# Patient Record
Sex: MALE | Race: WHITE | Employment: OTHER | URBAN - NONMETROPOLITAN AREA
[De-identification: names, ages, dates, MRNs, and addresses within clinical notes are randomized per-mention and may not be internally consistent; named-entity substitution may affect disease eponyms.]

---

## 2019-11-15 ENCOUNTER — OFFICE VISIT (OUTPATIENT)
Dept: CARDIOLOGY CLINIC | Age: 54
End: 2019-11-15
Payer: MEDICARE

## 2019-11-15 VITALS
DIASTOLIC BLOOD PRESSURE: 80 MMHG | WEIGHT: 242 LBS | HEIGHT: 65 IN | BODY MASS INDEX: 40.32 KG/M2 | HEART RATE: 100 BPM | SYSTOLIC BLOOD PRESSURE: 134 MMHG

## 2019-11-15 DIAGNOSIS — E78.01 FAMILIAL HYPERCHOLESTEROLEMIA: ICD-10-CM

## 2019-11-15 DIAGNOSIS — I10 ESSENTIAL HYPERTENSION: ICD-10-CM

## 2019-11-15 DIAGNOSIS — R06.83 SNORING: ICD-10-CM

## 2019-11-15 DIAGNOSIS — I25.10 CORONARY ARTERY DISEASE INVOLVING NATIVE CORONARY ARTERY OF NATIVE HEART WITHOUT ANGINA PECTORIS: ICD-10-CM

## 2019-11-15 DIAGNOSIS — R06.81 WITNESSED APNEIC SPELLS: Primary | ICD-10-CM

## 2019-11-15 PROCEDURE — G8417 CALC BMI ABV UP PARAM F/U: HCPCS | Performed by: NUCLEAR MEDICINE

## 2019-11-15 PROCEDURE — 4004F PT TOBACCO SCREEN RCVD TLK: CPT | Performed by: NUCLEAR MEDICINE

## 2019-11-15 PROCEDURE — 99204 OFFICE O/P NEW MOD 45 MIN: CPT | Performed by: NUCLEAR MEDICINE

## 2019-11-15 PROCEDURE — G8428 CUR MEDS NOT DOCUMENT: HCPCS | Performed by: NUCLEAR MEDICINE

## 2019-11-15 PROCEDURE — 93000 ELECTROCARDIOGRAM COMPLETE: CPT | Performed by: NUCLEAR MEDICINE

## 2019-11-15 PROCEDURE — G8598 ASA/ANTIPLAT THER USED: HCPCS | Performed by: NUCLEAR MEDICINE

## 2019-11-15 PROCEDURE — G8484 FLU IMMUNIZE NO ADMIN: HCPCS | Performed by: NUCLEAR MEDICINE

## 2019-11-15 PROCEDURE — 3017F COLORECTAL CA SCREEN DOC REV: CPT | Performed by: NUCLEAR MEDICINE

## 2019-11-15 RX ORDER — ATORVASTATIN CALCIUM 40 MG/1
40 TABLET, FILM COATED ORAL DAILY
COMMUNITY

## 2019-11-15 RX ORDER — BUSPIRONE HYDROCHLORIDE 15 MG/1
15 TABLET ORAL 3 TIMES DAILY
COMMUNITY

## 2019-11-15 RX ORDER — DOXEPIN HYDROCHLORIDE 100 MG/1
100 CAPSULE ORAL NIGHTLY
COMMUNITY

## 2019-11-15 RX ORDER — BUPROPION HYDROCHLORIDE 150 MG/1
150 TABLET, EXTENDED RELEASE ORAL DAILY
Status: ON HOLD | COMMUNITY
End: 2020-10-05 | Stop reason: HOSPADM

## 2019-11-15 RX ORDER — LISINOPRIL 10 MG/1
10 TABLET ORAL DAILY
COMMUNITY

## 2019-11-15 ASSESSMENT — ENCOUNTER SYMPTOMS
ANAL BLEEDING: 0
DIARRHEA: 0
CONSTIPATION: 0
SHORTNESS OF BREATH: 1
PHOTOPHOBIA: 0
RECTAL PAIN: 0
CHEST TIGHTNESS: 1
NAUSEA: 0
ABDOMINAL PAIN: 0
FACIAL SWELLING: 0
RHINORRHEA: 0
BACK PAIN: 1
ABDOMINAL DISTENTION: 0
BLOOD IN STOOL: 0
VOMITING: 0
COLOR CHANGE: 0

## 2019-11-26 DIAGNOSIS — I10 ESSENTIAL HYPERTENSION: ICD-10-CM

## 2019-11-26 DIAGNOSIS — R06.81 WITNESSED APNEIC SPELLS: ICD-10-CM

## 2019-11-26 DIAGNOSIS — R06.83 SNORING: ICD-10-CM

## 2019-11-26 DIAGNOSIS — I25.10 CORONARY ARTERY DISEASE INVOLVING NATIVE CORONARY ARTERY OF NATIVE HEART WITHOUT ANGINA PECTORIS: ICD-10-CM

## 2019-11-26 DIAGNOSIS — E78.01 FAMILIAL HYPERCHOLESTEROLEMIA: ICD-10-CM

## 2020-05-22 ENCOUNTER — VIRTUAL VISIT (OUTPATIENT)
Dept: CARDIOLOGY CLINIC | Age: 55
End: 2020-05-22
Payer: MEDICARE

## 2020-05-22 ENCOUNTER — TELEPHONE (OUTPATIENT)
Dept: CARDIOLOGY CLINIC | Age: 55
End: 2020-05-22

## 2020-05-22 PROCEDURE — 3017F COLORECTAL CA SCREEN DOC REV: CPT | Performed by: NUCLEAR MEDICINE

## 2020-05-22 PROCEDURE — 99213 OFFICE O/P EST LOW 20 MIN: CPT | Performed by: NUCLEAR MEDICINE

## 2020-05-22 PROCEDURE — G8428 CUR MEDS NOT DOCUMENT: HCPCS | Performed by: NUCLEAR MEDICINE

## 2020-05-22 NOTE — PROGRESS NOTES
times daily      atorvastatin (LIPITOR) 40 MG tablet Take 40 mg by mouth daily      fluticasone (FLOVENT HFA) 110 MCG/ACT inhaler Inhale 1 puff into the lungs 2 times daily      furosemide (LASIX) 20 MG tablet Take 20 mg by mouth daily       lurasidone (LATUDA) 20 MG TABS tablet Take 20 mg by mouth 2 times daily      escitalopram (LEXAPRO) 20 MG tablet Take 20 mg by mouth daily      gabapentin (NEURONTIN) 300 MG capsule Take 600 mg by mouth 3 times daily.  divalproex (DEPAKOTE) 250 MG ER tablet Take 500 mg by mouth 2 times daily Take 1 tab in the morning, 2 tabs at night      metoprolol (LOPRESSOR) 25 MG tablet Take 50 mg by mouth daily       tizanidine (ZANAFLEX) 4 MG tablet Take 4 mg by mouth every 6 hours as needed.  aspirin 325 MG tablet Take 325 mg by mouth daily.  nitroGLYCERIN (NITROSTAT) 0.4 MG SL tablet Place 0.4 mg under the tongue every 5 minutes as needed.  clopidogrel (PLAVIX) 75 MG tablet Take 75 mg by mouth daily.  albuterol (PROAIR HFA) 108 (90 BASE) MCG/ACT inhaler Inhale 2 puffs into the lungs every 6 hours as needed. No current facility-administered medications for this visit.       Allergies   Allergen Reactions    Zantac [Ranitidine Hcl] Anaphylaxis     Health Maintenance   Topic Date Due    Pneumococcal 0-64 years Vaccine (1 of 1 - PPSV23) 04/03/1972    HIV screen  04/03/1981    DTaP/Tdap/Td vaccine (1 - Tdap) 04/03/1985    Diabetes screen  04/03/2006    Lipid screen  12/02/2012    Shingles Vaccine (1 of 2) 04/03/2016    Colon cancer screen colonoscopy  04/03/2016    Potassium monitoring  09/14/2016    Creatinine monitoring  09/14/2016    Annual Wellness Visit (AWV)  10/13/2019    Flu vaccine (Season Ended) 09/01/2020    Hepatitis A vaccine  Aged Out    Hepatitis B vaccine  Aged Out    Hib vaccine  Aged Out    Meningococcal (ACWY) vaccine  Aged Out       Subjective:  Review of Systems  General:   No fever, no chills, some fatigue questions answered. Pt voicedunderstanding. Instructed to continue current medications, diet and exercise. Continue risk factor modification and medical management. Patient agreed with treatment plan. Follow up as directed.     Electronically signedby Ashley Roque MD on 5/22/2020 at 1:38 PM

## 2020-10-02 ENCOUNTER — HOSPITAL ENCOUNTER (INPATIENT)
Age: 55
LOS: 3 days | Discharge: HOME | DRG: 445 | End: 2020-10-05
Attending: FAMILY MEDICINE | Admitting: FAMILY MEDICINE
Payer: MEDICARE

## 2020-10-02 PROBLEM — R07.9 CHEST PAIN: Status: ACTIVE | Noted: 2020-10-02

## 2020-10-02 LAB
ALBUMIN SERPL-MCNC: 3.8 G/DL (ref 3.5–5.1)
ALP BLD-CCNC: 96 U/L (ref 38–126)
ALT SERPL-CCNC: 34 U/L (ref 11–66)
ANION GAP SERPL CALCULATED.3IONS-SCNC: 12 MEQ/L (ref 8–16)
APTT: 24.6 SECONDS (ref 22–38)
AST SERPL-CCNC: 38 U/L (ref 5–40)
BASOPHILS # BLD: 0.4 %
BASOPHILS ABSOLUTE: 0.1 THOU/MM3 (ref 0–0.1)
BILIRUB SERPL-MCNC: 0.3 MG/DL (ref 0.3–1.2)
BUN BLDV-MCNC: 10 MG/DL (ref 7–22)
CALCIUM SERPL-MCNC: 9.1 MG/DL (ref 8.5–10.5)
CHLORIDE BLD-SCNC: 103 MEQ/L (ref 98–111)
CO2: 24 MEQ/L (ref 23–33)
CREAT SERPL-MCNC: 0.8 MG/DL (ref 0.4–1.2)
EKG ATRIAL RATE: 91 BPM
EKG P AXIS: 44 DEGREES
EKG P-R INTERVAL: 138 MS
EKG Q-T INTERVAL: 354 MS
EKG QRS DURATION: 82 MS
EKG QTC CALCULATION (BAZETT): 435 MS
EKG R AXIS: 17 DEGREES
EKG T AXIS: 14 DEGREES
EKG VENTRICULAR RATE: 91 BPM
EOSINOPHIL # BLD: 0.1 %
EOSINOPHILS ABSOLUTE: 0 THOU/MM3 (ref 0–0.4)
ERYTHROCYTE [DISTWIDTH] IN BLOOD BY AUTOMATED COUNT: 16.1 % (ref 11.5–14.5)
ERYTHROCYTE [DISTWIDTH] IN BLOOD BY AUTOMATED COUNT: 46.9 FL (ref 35–45)
GFR SERPL CREATININE-BSD FRML MDRD: > 90 ML/MIN/1.73M2
GLUCOSE BLD-MCNC: 129 MG/DL (ref 70–108)
HCT VFR BLD CALC: 36.3 % (ref 42–52)
HEMOGLOBIN: 11.6 GM/DL (ref 14–18)
IMMATURE GRANS (ABS): 0.06 THOU/MM3 (ref 0–0.07)
IMMATURE GRANULOCYTES: 0.4 %
INR BLD: 1.08 (ref 0.85–1.13)
LYMPHOCYTES # BLD: 19.5 %
LYMPHOCYTES ABSOLUTE: 3 THOU/MM3 (ref 1–4.8)
MAGNESIUM: 1.9 MG/DL (ref 1.6–2.4)
MCH RBC QN AUTO: 25.8 PG (ref 26–33)
MCHC RBC AUTO-ENTMCNC: 32 GM/DL (ref 32.2–35.5)
MCV RBC AUTO: 80.7 FL (ref 80–94)
MONOCYTES # BLD: 5.5 %
MONOCYTES ABSOLUTE: 0.8 THOU/MM3 (ref 0.4–1.3)
NUCLEATED RED BLOOD CELLS: 0 /100 WBC
PLATELET # BLD: 369 THOU/MM3 (ref 130–400)
PMV BLD AUTO: 8.5 FL (ref 9.4–12.4)
POTASSIUM SERPL-SCNC: 4.3 MEQ/L (ref 3.5–5.2)
PRO-BNP: 139.1 PG/ML (ref 0–900)
RBC # BLD: 4.5 MILL/MM3 (ref 4.7–6.1)
SEG NEUTROPHILS: 74.1 %
SEGMENTED NEUTROPHILS ABSOLUTE COUNT: 11.4 THOU/MM3 (ref 1.8–7.7)
SODIUM BLD-SCNC: 139 MEQ/L (ref 135–145)
TOTAL PROTEIN: 7.3 G/DL (ref 6.1–8)
TROPONIN T: < 0.01 NG/ML
TROPONIN T: < 0.01 NG/ML
WBC # BLD: 15.4 THOU/MM3 (ref 4.8–10.8)

## 2020-10-02 PROCEDURE — 2580000003 HC RX 258: Performed by: PHYSICIAN ASSISTANT

## 2020-10-02 PROCEDURE — 36415 COLL VENOUS BLD VENIPUNCTURE: CPT

## 2020-10-02 PROCEDURE — 6360000002 HC RX W HCPCS

## 2020-10-02 PROCEDURE — 85610 PROTHROMBIN TIME: CPT

## 2020-10-02 PROCEDURE — 85025 COMPLETE CBC W/AUTO DIFF WBC: CPT

## 2020-10-02 PROCEDURE — 94640 AIRWAY INHALATION TREATMENT: CPT

## 2020-10-02 PROCEDURE — G0378 HOSPITAL OBSERVATION PER HR: HCPCS

## 2020-10-02 PROCEDURE — 85730 THROMBOPLASTIN TIME PARTIAL: CPT

## 2020-10-02 PROCEDURE — 99222 1ST HOSP IP/OBS MODERATE 55: CPT | Performed by: PHYSICIAN ASSISTANT

## 2020-10-02 PROCEDURE — 84484 ASSAY OF TROPONIN QUANT: CPT

## 2020-10-02 PROCEDURE — 83880 ASSAY OF NATRIURETIC PEPTIDE: CPT

## 2020-10-02 PROCEDURE — G0379 DIRECT REFER HOSPITAL OBSERV: HCPCS

## 2020-10-02 PROCEDURE — 80053 COMPREHEN METABOLIC PANEL: CPT

## 2020-10-02 PROCEDURE — 1200000003 HC TELEMETRY R&B

## 2020-10-02 PROCEDURE — 94760 N-INVAS EAR/PLS OXIMETRY 1: CPT

## 2020-10-02 PROCEDURE — 93005 ELECTROCARDIOGRAM TRACING: CPT | Performed by: FAMILY MEDICINE

## 2020-10-02 PROCEDURE — 83735 ASSAY OF MAGNESIUM: CPT

## 2020-10-02 PROCEDURE — 6370000000 HC RX 637 (ALT 250 FOR IP): Performed by: PHYSICIAN ASSISTANT

## 2020-10-02 RX ORDER — DOXEPIN HYDROCHLORIDE 50 MG/1
100 CAPSULE ORAL NIGHTLY
Status: DISCONTINUED | OUTPATIENT
Start: 2020-10-02 | End: 2020-10-05 | Stop reason: HOSPADM

## 2020-10-02 RX ORDER — OMEPRAZOLE 20 MG/1
20 CAPSULE, DELAYED RELEASE ORAL DAILY
COMMUNITY

## 2020-10-02 RX ORDER — MORPHINE SULFATE 2 MG/ML
INJECTION, SOLUTION INTRAMUSCULAR; INTRAVENOUS
Status: COMPLETED
Start: 2020-10-02 | End: 2020-10-02

## 2020-10-02 RX ORDER — ASPIRIN 81 MG/1
81 TABLET, CHEWABLE ORAL DAILY
Status: DISCONTINUED | OUTPATIENT
Start: 2020-10-03 | End: 2020-10-05 | Stop reason: HOSPADM

## 2020-10-02 RX ORDER — POTASSIUM CHLORIDE 750 MG/1
10 TABLET, EXTENDED RELEASE ORAL DAILY
COMMUNITY

## 2020-10-02 RX ORDER — FLUTICASONE PROPIONATE 110 UG/1
1 AEROSOL, METERED RESPIRATORY (INHALATION) 2 TIMES DAILY
Status: DISCONTINUED | OUTPATIENT
Start: 2020-10-02 | End: 2020-10-05 | Stop reason: HOSPADM

## 2020-10-02 RX ORDER — MORPHINE SULFATE 2 MG/ML
2 INJECTION, SOLUTION INTRAMUSCULAR; INTRAVENOUS ONCE
Status: DISCONTINUED | OUTPATIENT
Start: 2020-10-02 | End: 2020-10-03

## 2020-10-02 RX ORDER — PROMETHAZINE HYDROCHLORIDE 25 MG/1
12.5 TABLET ORAL EVERY 6 HOURS PRN
Status: DISCONTINUED | OUTPATIENT
Start: 2020-10-02 | End: 2020-10-05 | Stop reason: HOSPADM

## 2020-10-02 RX ORDER — ESCITALOPRAM OXALATE 20 MG/1
20 TABLET ORAL DAILY
Status: DISCONTINUED | OUTPATIENT
Start: 2020-10-03 | End: 2020-10-05 | Stop reason: HOSPADM

## 2020-10-02 RX ORDER — POLYETHYLENE GLYCOL 3350 17 G/17G
17 POWDER, FOR SOLUTION ORAL DAILY PRN
Status: DISCONTINUED | OUTPATIENT
Start: 2020-10-02 | End: 2020-10-05 | Stop reason: HOSPADM

## 2020-10-02 RX ORDER — GABAPENTIN 400 MG/1
800 CAPSULE ORAL 3 TIMES DAILY
Status: DISCONTINUED | OUTPATIENT
Start: 2020-10-02 | End: 2020-10-05 | Stop reason: HOSPADM

## 2020-10-02 RX ORDER — LISINOPRIL 10 MG/1
10 TABLET ORAL DAILY
Status: DISCONTINUED | OUTPATIENT
Start: 2020-10-03 | End: 2020-10-05 | Stop reason: HOSPADM

## 2020-10-02 RX ORDER — BUSPIRONE HYDROCHLORIDE 7.5 MG/1
15 TABLET ORAL 3 TIMES DAILY
Status: DISCONTINUED | OUTPATIENT
Start: 2020-10-02 | End: 2020-10-05 | Stop reason: HOSPADM

## 2020-10-02 RX ORDER — ATORVASTATIN CALCIUM 40 MG/1
40 TABLET, FILM COATED ORAL NIGHTLY
Status: DISCONTINUED | OUTPATIENT
Start: 2020-10-02 | End: 2020-10-02

## 2020-10-02 RX ORDER — SODIUM CHLORIDE 0.9 % (FLUSH) 0.9 %
10 SYRINGE (ML) INJECTION PRN
Status: DISCONTINUED | OUTPATIENT
Start: 2020-10-02 | End: 2020-10-05 | Stop reason: HOSPADM

## 2020-10-02 RX ORDER — ACETAMINOPHEN 650 MG/1
650 SUPPOSITORY RECTAL EVERY 6 HOURS PRN
Status: DISCONTINUED | OUTPATIENT
Start: 2020-10-02 | End: 2020-10-04

## 2020-10-02 RX ORDER — SODIUM CHLORIDE 0.9 % (FLUSH) 0.9 %
10 SYRINGE (ML) INJECTION EVERY 12 HOURS SCHEDULED
Status: DISCONTINUED | OUTPATIENT
Start: 2020-10-02 | End: 2020-10-05 | Stop reason: HOSPADM

## 2020-10-02 RX ORDER — ACETAMINOPHEN 325 MG/1
650 TABLET ORAL EVERY 6 HOURS PRN
Status: DISCONTINUED | OUTPATIENT
Start: 2020-10-02 | End: 2020-10-04

## 2020-10-02 RX ORDER — BUPROPION HYDROCHLORIDE 150 MG/1
150 TABLET, EXTENDED RELEASE ORAL DAILY
Status: DISCONTINUED | OUTPATIENT
Start: 2020-10-02 | End: 2020-10-02

## 2020-10-02 RX ORDER — PANTOPRAZOLE SODIUM 40 MG/1
40 TABLET, DELAYED RELEASE ORAL
Status: DISCONTINUED | OUTPATIENT
Start: 2020-10-03 | End: 2020-10-05 | Stop reason: HOSPADM

## 2020-10-02 RX ORDER — NITROGLYCERIN 0.4 MG/1
0.4 TABLET SUBLINGUAL EVERY 5 MIN PRN
Status: COMPLETED | OUTPATIENT
Start: 2020-10-02 | End: 2020-10-02

## 2020-10-02 RX ORDER — CLOPIDOGREL BISULFATE 75 MG/1
75 TABLET ORAL DAILY
Status: DISCONTINUED | OUTPATIENT
Start: 2020-10-03 | End: 2020-10-05 | Stop reason: HOSPADM

## 2020-10-02 RX ORDER — ONDANSETRON 2 MG/ML
4 INJECTION INTRAMUSCULAR; INTRAVENOUS EVERY 6 HOURS PRN
Status: DISCONTINUED | OUTPATIENT
Start: 2020-10-02 | End: 2020-10-05 | Stop reason: HOSPADM

## 2020-10-02 RX ORDER — FUROSEMIDE 20 MG/1
20 TABLET ORAL DAILY
Status: DISCONTINUED | OUTPATIENT
Start: 2020-10-03 | End: 2020-10-05 | Stop reason: HOSPADM

## 2020-10-02 RX ORDER — SUCRALFATE 1 G/1
1 TABLET ORAL EVERY 6 HOURS SCHEDULED
Status: DISCONTINUED | OUTPATIENT
Start: 2020-10-03 | End: 2020-10-05 | Stop reason: HOSPADM

## 2020-10-02 RX ORDER — ASPIRIN 325 MG
325 TABLET ORAL DAILY
Status: DISCONTINUED | OUTPATIENT
Start: 2020-10-02 | End: 2020-10-02

## 2020-10-02 RX ORDER — ATORVASTATIN CALCIUM 40 MG/1
40 TABLET, FILM COATED ORAL DAILY
Status: DISCONTINUED | OUTPATIENT
Start: 2020-10-03 | End: 2020-10-05 | Stop reason: HOSPADM

## 2020-10-02 RX ORDER — DIVALPROEX SODIUM 500 MG/1
500 TABLET, EXTENDED RELEASE ORAL 2 TIMES DAILY
Status: DISCONTINUED | OUTPATIENT
Start: 2020-10-02 | End: 2020-10-02

## 2020-10-02 RX ADMIN — BUSPIRONE HYDROCHLORIDE 15 MG: 7.5 TABLET ORAL at 20:56

## 2020-10-02 RX ADMIN — LIDOCAINE HYDROCHLORIDE: 20 SOLUTION ORAL; TOPICAL at 19:59

## 2020-10-02 RX ADMIN — MORPHINE SULFATE 2 MG: 2 INJECTION, SOLUTION INTRAMUSCULAR; INTRAVENOUS at 20:49

## 2020-10-02 RX ADMIN — GABAPENTIN 800 MG: 400 CAPSULE ORAL at 20:56

## 2020-10-02 RX ADMIN — LIDOCAINE HYDROCHLORIDE: 20 SOLUTION ORAL; TOPICAL at 23:57

## 2020-10-02 RX ADMIN — LURASIDONE HYDROCHLORIDE 80 MG: 40 TABLET, FILM COATED ORAL at 20:56

## 2020-10-02 RX ADMIN — DOXEPIN HYDROCHLORIDE 100 MG: 50 CAPSULE ORAL at 20:55

## 2020-10-02 RX ADMIN — FLUTICASONE PROPIONATE 1 PUFF: 110 AEROSOL, METERED RESPIRATORY (INHALATION) at 21:06

## 2020-10-02 RX ADMIN — NITROGLYCERIN 0.4 MG: 0.4 TABLET, ORALLY DISINTEGRATING SUBLINGUAL at 20:30

## 2020-10-02 RX ADMIN — Medication 10 ML: at 20:55

## 2020-10-02 RX ADMIN — METOPROLOL TARTRATE 12.5 MG: 25 TABLET ORAL at 20:55

## 2020-10-02 RX ADMIN — NITROGLYCERIN 0.4 MG: 0.4 TABLET, ORALLY DISINTEGRATING SUBLINGUAL at 20:23

## 2020-10-02 RX ADMIN — NITROGLYCERIN 0.4 MG: 0.4 TABLET, ORALLY DISINTEGRATING SUBLINGUAL at 20:37

## 2020-10-02 ASSESSMENT — PAIN SCALES - GENERAL
PAINLEVEL_OUTOF10: 8
PAINLEVEL_OUTOF10: 10
PAINLEVEL_OUTOF10: 8
PAINLEVEL_OUTOF10: 10
PAINLEVEL_OUTOF10: 10

## 2020-10-02 ASSESSMENT — PAIN DESCRIPTION - PROGRESSION
CLINICAL_PROGRESSION: NOT CHANGED

## 2020-10-02 ASSESSMENT — PAIN DESCRIPTION - ORIENTATION: ORIENTATION: MID

## 2020-10-02 ASSESSMENT — PAIN DESCRIPTION - FREQUENCY: FREQUENCY: CONTINUOUS

## 2020-10-02 ASSESSMENT — PAIN DESCRIPTION - ONSET: ONSET: ON-GOING

## 2020-10-02 ASSESSMENT — PAIN DESCRIPTION - DESCRIPTORS: DESCRIPTORS: ACHING

## 2020-10-02 ASSESSMENT — PAIN - FUNCTIONAL ASSESSMENT: PAIN_FUNCTIONAL_ASSESSMENT: PREVENTS OR INTERFERES SOME ACTIVE ACTIVITIES AND ADLS

## 2020-10-02 ASSESSMENT — PAIN DESCRIPTION - PAIN TYPE: TYPE: ACUTE PAIN

## 2020-10-02 ASSESSMENT — PAIN DESCRIPTION - DIRECTION: RADIATING_TOWARDS: NO

## 2020-10-02 ASSESSMENT — PAIN DESCRIPTION - LOCATION: LOCATION: CHEST

## 2020-10-02 NOTE — PROGRESS NOTES
Transfer  Report from [place] [id number]  Referring Physician  Dr. Cara Ascencio  Accepting Physician  Dr. Paulie Rosario  Patient Reji Serrano  [birth year] patient of Dr. Cara Ascencio at [facility] ER that see Dr. Elaine Lowry has a history of stents, CAD, HTN, hyperlipidemia. Went to the ER today with complaints of chest pain, sweating, dry heaving, no hypoxia. Given Heparin, Nitro, Zofran, Morphine. Dr. Cara Ascencio states no changes on the monitor. Troponin -, WBC 13.9, Chemistry-good. CXR -, No Covid testing done. Vitals 147/75, 85, 20, 98% RA.  Admit-Obs, Tele, Chest pain

## 2020-10-02 NOTE — H&P
[facility] Hospitalist History & Physical   10/2/2020  6:21 PM   Assessment and Plan:        1. Acute Epigastic Abdominal Pain / Hx GERD  a. Worse with food. Burning. Describes as heart burn. b. TTP of epigastric region. No guarding, rigidity. c. Trial GI cocktail. Resume PPI. Consult GI.  2. CAD s/p 1 stent (2008) / HFpEF 60%, compensated:  a. Denies CP. Epigastric pain did not improve with nitro and nonexertional. Follows with Dr. Ramirez Vinson  b. Troponin neg x 1 (at [facility]) EKG (from [facility]) shows NSR with inverted T-waves/q-wave in aVR  c. Trend troponin x2 to r/o cardiac etiology of epigastric pain. Resume DAPT/statin/lasix. 3. Chronic Normocytic Anemia, stable: unclear etiology, ? Chronic disease.  hgb 13.9, MCV 78. Monitor with CBC daily. 4. COPD (without baseline supplement O2 use) / Asthma, without acute exacerbation:  a. Will wean patient off of O2 with no documented hypoxia. Resume home inhaled steroid. Unclear why not on tiotropium inhaler, consider starting. 5. NIDDM type 2, controlled: will hold metformin. SSI for goal -180 daily. Hypoglycemia orders. 6. 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 Resume DAPT  7. 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 Depression / Anxiety / Fibromyalgia: resume home doxepin, gabapentin, latuda, buspar, lexapro. 9. HLD: resume home statin  10. Essential Hypertension, controlled: Resume home ACEi, lasix, BB  11. Obesity: BMI 39,  on weight loss.    12. Tobacco Use Disorder:  on tobacco cessation      CC: Epigastric Abdominal pain  HPI: Tai Dempsey is a 48year old white male smoker with PMH of CAD s/p 1stent (2008), CVA (03/2019) with residual left facial droop and left sided weakness, GERD, NIDDM type 2, Fibromyalgia, DVT (20 years ago) not on anticoagulation, HFpEF, COPD without baseline supplmental O2 use who presented to [facility] as direct transfer from [facility] complaining of 10/10 \"really bad heart burn\" that feels like burning, aching and occasionally sharp that started around 15:00 on today after eating and then lying down. It improved while in the ED after receiving morphine but worsened again after he at in the ED. It is not exertional, and did not improve with the SL nitro/nitro drip. Associated nausea and vomiting. Denies arm/shoulder/jaw pain, denies inc SOB from baseline, lightheadedness, hematochezia, hematemesis, melena, fever. In the ED, vitals showed: HR 94, /77, RR 23, 93% on RA on arrival but was placed on 3L for unclear reason and afebrile. Labs showsed: WBC 13.9, hgb 12.4, MCV 78, plt 414, CMP unremarkable, ddimer mildly elevated at 0.52 (0-0.49 as reference range). Troponin neg. CXR showed no acute findings. Patient was given SL nitro/nitro drip (which did not relieve the pain), zofran, heparin drip, and morphine. ROS: A 14 point ROS was performed and was negative other than the pertinent positives noted in the HPI  PMH:  Per HPI  SHX:  Denies EtOH use, denies drug use. Current smoker: 5 cigarettes a day. Caffeine: 4 sodas/day. FHX: Mother/Father: CAD/MI, CVA, DM, HTN, HLD, A.fib.   Allergies: Bees, Zyrtec  Medications:       sodium chloride flush  10 mL Intravenous 2 times per day    [START ON 10/3/2020] aspirin  81 mg Oral Daily    [START ON 10/3/2020] atorvastatin  40 mg Oral Daily    busPIRone  15 mg Oral TID    [START ON 10/3/2020] clopidogrel  75 mg Oral Daily    doxepin  100 mg Oral Nightly    [START ON 10/3/2020] escitalopram  20 mg Oral Daily    fluticasone  1 puff Inhalation BID    [START ON 10/3/2020] furosemide  20 mg Oral Daily    gabapentin  800 mg Oral TID    [START ON 10/3/2020] lisinopril  10 mg Oral Daily    lurasidone  80 mg Oral Dinner    metoprolol tartrate  12.5 mg Oral BID    [START ON 10/3/2020] pantoprazole  40 mg Oral QAM AC    [Held by provider] buPROPion  150 mg Oral Daily    [Held by provider] meq/L    Potassium 4.3 3.5 - 5.2 meq/L    Chloride 103 98 - 111 meq/L    CO2 24 23 - 33 meq/L    Calcium 9.1 8.5 - 10.5 mg/dL    AST 38 5 - 40 U/L    Alkaline Phosphatase 96 38 - 126 U/L    Total Protein 7.3 6.1 - 8.0 g/dL    Alb 3.8 3.5 - 5.1 g/dL    Total Bilirubin 0.3 0.3 - 1.2 mg/dL    ALT 34 11 - 66 U/L   Magnesium   Result Value Ref Range    Magnesium 1.9 1.6 - 2.4 mg/dL   CBC auto differential   Result Value Ref Range    WBC 15.4 (H) 4.8 - 10.8 thou/mm3    RBC 4.50 (L) 4.70 - 6.10 mill/mm3    Hemoglobin 11.6 (L) 14.0 - 18.0 gm/dl    Hematocrit 36.3 (L) 42.0 - 52.0 %    MCV 80.7 80.0 - 94.0 fL    MCH 25.8 (L) 26.0 - 33.0 pg    MCHC 32.0 (L) 32.2 - 35.5 gm/dl    RDW-CV 16.1 (H) 11.5 - 14.5 %    RDW-SD 46.9 (H) 35.0 - 45.0 fL    Platelets 058 444 - 541 thou/mm3    MPV 8.5 (L) 9.4 - 12.4 fL    Seg Neutrophils 74.1 %    Lymphocytes 19.5 %    Monocytes 5.5 %    Eosinophils 0.1 %    Basophils 0.4 %    Immature Granulocytes 0.4 %    Segs Absolute 11.4 (H) 1.8 - 7.7 thou/mm3    Lymphocytes Absolute 3.0 1.0 - 4.8 thou/mm3    Monocytes Absolute 0.8 0.4 - 1.3 thou/mm3    Eosinophils Absolute 0.0 0.0 - 0.4 thou/mm3    Basophils Absolute 0.1 0.0 - 0.1 thou/mm3    Immature Grans (Abs) 0.06 0.00 - 0.07 thou/mm3    nRBC 0 /100 wbc   APTT   Result Value Ref Range    aPTT 24.6 22.0 - 38.0 seconds   Protime-INR   Result Value Ref Range    INR 1.08 0.85 - 1.13   Anion Gap   Result Value Ref Range    Anion Gap 12.0 8.0 - 16.0 meq/L   Glomerular Filtration Rate, Estimated   Result Value Ref Range    Est, Glom Filt Rate >90 ml/min/1.73m2   EKG 12 Lead   Result Value Ref Range    Ventricular Rate 91 BPM    Atrial Rate 91 BPM    P-R Interval 138 ms    QRS Duration 82 ms    Q-T Interval 354 ms    QTc Calculation (Bazett) 435 ms    P Axis 44 degrees    R Axis 17 degrees    T Axis 14 degrees       EKG / Radiology:     EKG:  Reviewed by me:  EKG (from [facility]) shows NSR with inverted T-waves/q-wave in aVR      No results found. See outside radiology report per HPI.     Case and Plan discussed with Willem Deras MD  Electronically signed by Laurita Gtz on 10/2/2020 at 8:52 PM

## 2020-10-03 ENCOUNTER — APPOINTMENT (OUTPATIENT)
Dept: ULTRASOUND IMAGING | Age: 55
DRG: 445 | End: 2020-10-03
Attending: FAMILY MEDICINE
Payer: MEDICARE

## 2020-10-03 LAB
ANION GAP SERPL CALCULATED.3IONS-SCNC: 12 MEQ/L (ref 8–16)
BACTERIA: ABNORMAL /HPF
BILIRUBIN URINE: NEGATIVE
BLOOD, URINE: NEGATIVE
BUN BLDV-MCNC: 11 MG/DL (ref 7–22)
CALCIUM SERPL-MCNC: 8.7 MG/DL (ref 8.5–10.5)
CASTS 2: ABNORMAL /LPF
CASTS UA: ABNORMAL /LPF
CHARACTER, URINE: ABNORMAL
CHLORIDE BLD-SCNC: 101 MEQ/L (ref 98–111)
CO2: 24 MEQ/L (ref 23–33)
COLOR: YELLOW
CREAT SERPL-MCNC: 0.8 MG/DL (ref 0.4–1.2)
CRYSTALS, UA: ABNORMAL
EPITHELIAL CELLS, UA: ABNORMAL /HPF
ERYTHROCYTE [DISTWIDTH] IN BLOOD BY AUTOMATED COUNT: 16.3 % (ref 11.5–14.5)
ERYTHROCYTE [DISTWIDTH] IN BLOOD BY AUTOMATED COUNT: 48.5 FL (ref 35–45)
GFR SERPL CREATININE-BSD FRML MDRD: > 90 ML/MIN/1.73M2
GLUCOSE BLD-MCNC: 117 MG/DL (ref 70–108)
GLUCOSE URINE: NEGATIVE MG/DL
HCT VFR BLD CALC: 35.4 % (ref 42–52)
HEMOGLOBIN: 11.2 GM/DL (ref 14–18)
KETONES, URINE: NEGATIVE
LEUKOCYTE ESTERASE, URINE: ABNORMAL
MCH RBC QN AUTO: 26 PG (ref 26–33)
MCHC RBC AUTO-ENTMCNC: 31.6 GM/DL (ref 32.2–35.5)
MCV RBC AUTO: 82.1 FL (ref 80–94)
MISCELLANEOUS 2: ABNORMAL
NITRITE, URINE: NEGATIVE
PH UA: 5 (ref 5–9)
PLATELET # BLD: 351 THOU/MM3 (ref 130–400)
PMV BLD AUTO: 8.6 FL (ref 9.4–12.4)
POTASSIUM REFLEX MAGNESIUM: 4.1 MEQ/L (ref 3.5–5.2)
PROTEIN UA: NEGATIVE
RBC # BLD: 4.31 MILL/MM3 (ref 4.7–6.1)
RBC URINE: ABNORMAL /HPF
RENAL EPITHELIAL, UA: ABNORMAL
SODIUM BLD-SCNC: 137 MEQ/L (ref 135–145)
SPECIFIC GRAVITY, URINE: 1.03 (ref 1–1.03)
TROPONIN T: < 0.01 NG/ML
UROBILINOGEN, URINE: 0.2 EU/DL (ref 0–1)
WBC # BLD: 11.7 THOU/MM3 (ref 4.8–10.8)
WBC UA: ABNORMAL /HPF
YEAST: ABNORMAL

## 2020-10-03 PROCEDURE — 85027 COMPLETE CBC AUTOMATED: CPT

## 2020-10-03 PROCEDURE — 2580000003 HC RX 258: Performed by: PHYSICIAN ASSISTANT

## 2020-10-03 PROCEDURE — 84484 ASSAY OF TROPONIN QUANT: CPT

## 2020-10-03 PROCEDURE — 99232 SBSQ HOSP IP/OBS MODERATE 35: CPT | Performed by: PHYSICIAN ASSISTANT

## 2020-10-03 PROCEDURE — 81001 URINALYSIS AUTO W/SCOPE: CPT

## 2020-10-03 PROCEDURE — 6360000002 HC RX W HCPCS: Performed by: STUDENT IN AN ORGANIZED HEALTH CARE EDUCATION/TRAINING PROGRAM

## 2020-10-03 PROCEDURE — 2700000000 HC OXYGEN THERAPY PER DAY

## 2020-10-03 PROCEDURE — 6370000000 HC RX 637 (ALT 250 FOR IP): Performed by: PHYSICIAN ASSISTANT

## 2020-10-03 PROCEDURE — 76705 ECHO EXAM OF ABDOMEN: CPT

## 2020-10-03 PROCEDURE — 36415 COLL VENOUS BLD VENIPUNCTURE: CPT

## 2020-10-03 PROCEDURE — 94640 AIRWAY INHALATION TREATMENT: CPT

## 2020-10-03 PROCEDURE — 87086 URINE CULTURE/COLONY COUNT: CPT

## 2020-10-03 PROCEDURE — 80048 BASIC METABOLIC PNL TOTAL CA: CPT

## 2020-10-03 PROCEDURE — 1200000003 HC TELEMETRY R&B

## 2020-10-03 PROCEDURE — 94760 N-INVAS EAR/PLS OXIMETRY 1: CPT

## 2020-10-03 PROCEDURE — 6360000002 HC RX W HCPCS: Performed by: PHYSICIAN ASSISTANT

## 2020-10-03 RX ORDER — ALBUTEROL SULFATE 2.5 MG/3ML
2.5 SOLUTION RESPIRATORY (INHALATION) EVERY 6 HOURS PRN
Status: DISCONTINUED | OUTPATIENT
Start: 2020-10-03 | End: 2020-10-05 | Stop reason: HOSPADM

## 2020-10-03 RX ORDER — MORPHINE SULFATE 2 MG/ML
2 INJECTION, SOLUTION INTRAMUSCULAR; INTRAVENOUS
Status: DISCONTINUED | OUTPATIENT
Start: 2020-10-03 | End: 2020-10-03

## 2020-10-03 RX ADMIN — MORPHINE SULFATE 2 MG: 2 INJECTION, SOLUTION INTRAMUSCULAR; INTRAVENOUS at 00:17

## 2020-10-03 RX ADMIN — SUCRALFATE 1 G: 1 TABLET ORAL at 01:36

## 2020-10-03 RX ADMIN — GABAPENTIN 800 MG: 400 CAPSULE ORAL at 12:57

## 2020-10-03 RX ADMIN — ESCITALOPRAM 20 MG: 20 TABLET, FILM COATED ORAL at 08:14

## 2020-10-03 RX ADMIN — LIDOCAINE HYDROCHLORIDE: 20 SOLUTION ORAL; TOPICAL at 08:14

## 2020-10-03 RX ADMIN — MORPHINE SULFATE 2 MG: 2 INJECTION, SOLUTION INTRAMUSCULAR; INTRAVENOUS at 04:15

## 2020-10-03 RX ADMIN — FLUTICASONE PROPIONATE 1 PUFF: 110 AEROSOL, METERED RESPIRATORY (INHALATION) at 17:59

## 2020-10-03 RX ADMIN — BUSPIRONE HYDROCHLORIDE 15 MG: 7.5 TABLET ORAL at 08:14

## 2020-10-03 RX ADMIN — HYDROMORPHONE HYDROCHLORIDE 0.5 MG: 1 INJECTION, SOLUTION INTRAMUSCULAR; INTRAVENOUS; SUBCUTANEOUS at 16:26

## 2020-10-03 RX ADMIN — Medication 10 ML: at 20:09

## 2020-10-03 RX ADMIN — SUCRALFATE 1 G: 1 TABLET ORAL at 11:47

## 2020-10-03 RX ADMIN — PANTOPRAZOLE SODIUM 40 MG: 40 TABLET, DELAYED RELEASE ORAL at 05:08

## 2020-10-03 RX ADMIN — LISINOPRIL 10 MG: 10 TABLET ORAL at 08:16

## 2020-10-03 RX ADMIN — CLOPIDOGREL BISULFATE 75 MG: 75 TABLET ORAL at 08:15

## 2020-10-03 RX ADMIN — BUSPIRONE HYDROCHLORIDE 15 MG: 7.5 TABLET ORAL at 20:18

## 2020-10-03 RX ADMIN — ATORVASTATIN CALCIUM 40 MG: 40 TABLET, FILM COATED ORAL at 08:14

## 2020-10-03 RX ADMIN — DOXEPIN HYDROCHLORIDE 100 MG: 50 CAPSULE ORAL at 20:06

## 2020-10-03 RX ADMIN — SUCRALFATE 1 G: 1 TABLET ORAL at 05:08

## 2020-10-03 RX ADMIN — LURASIDONE HYDROCHLORIDE 80 MG: 40 TABLET, FILM COATED ORAL at 16:26

## 2020-10-03 RX ADMIN — Medication 10 ML: at 08:16

## 2020-10-03 RX ADMIN — ASPIRIN 81 MG: 81 TABLET, CHEWABLE ORAL at 08:15

## 2020-10-03 RX ADMIN — MORPHINE SULFATE 2 MG: 2 INJECTION, SOLUTION INTRAMUSCULAR; INTRAVENOUS at 08:15

## 2020-10-03 RX ADMIN — SUCRALFATE 1 G: 1 TABLET ORAL at 16:26

## 2020-10-03 RX ADMIN — METOPROLOL TARTRATE 12.5 MG: 25 TABLET ORAL at 08:14

## 2020-10-03 RX ADMIN — FLUTICASONE PROPIONATE 1 PUFF: 110 AEROSOL, METERED RESPIRATORY (INHALATION) at 07:52

## 2020-10-03 RX ADMIN — FUROSEMIDE 20 MG: 20 TABLET ORAL at 08:14

## 2020-10-03 RX ADMIN — Medication 10 ML: at 00:18

## 2020-10-03 RX ADMIN — GABAPENTIN 800 MG: 400 CAPSULE ORAL at 08:14

## 2020-10-03 RX ADMIN — GABAPENTIN 800 MG: 400 CAPSULE ORAL at 20:07

## 2020-10-03 RX ADMIN — HYDROMORPHONE HYDROCHLORIDE 0.5 MG: 1 INJECTION, SOLUTION INTRAMUSCULAR; INTRAVENOUS; SUBCUTANEOUS at 11:53

## 2020-10-03 RX ADMIN — BUSPIRONE HYDROCHLORIDE 15 MG: 7.5 TABLET ORAL at 12:57

## 2020-10-03 RX ADMIN — HYDROMORPHONE HYDROCHLORIDE 0.5 MG: 1 INJECTION, SOLUTION INTRAMUSCULAR; INTRAVENOUS; SUBCUTANEOUS at 20:25

## 2020-10-03 RX ADMIN — METOPROLOL TARTRATE 12.5 MG: 25 TABLET ORAL at 20:07

## 2020-10-03 ASSESSMENT — PAIN SCALES - GENERAL
PAINLEVEL_OUTOF10: 10
PAINLEVEL_OUTOF10: 0
PAINLEVEL_OUTOF10: 7
PAINLEVEL_OUTOF10: 8
PAINLEVEL_OUTOF10: 7
PAINLEVEL_OUTOF10: 8
PAINLEVEL_OUTOF10: 10
PAINLEVEL_OUTOF10: 10
PAINLEVEL_OUTOF10: 7
PAINLEVEL_OUTOF10: 10
PAINLEVEL_OUTOF10: 10
PAINLEVEL_OUTOF10: 9
PAINLEVEL_OUTOF10: 10

## 2020-10-03 ASSESSMENT — PAIN DESCRIPTION - PROGRESSION
CLINICAL_PROGRESSION: NOT CHANGED
CLINICAL_PROGRESSION: GRADUALLY WORSENING
CLINICAL_PROGRESSION: NOT CHANGED
CLINICAL_PROGRESSION: NOT CHANGED

## 2020-10-03 ASSESSMENT — PAIN DESCRIPTION - ONSET
ONSET: ON-GOING
ONSET: ON-GOING

## 2020-10-03 ASSESSMENT — PAIN DESCRIPTION - ORIENTATION
ORIENTATION: MID
ORIENTATION: MID
ORIENTATION: MID;UPPER

## 2020-10-03 ASSESSMENT — PAIN DESCRIPTION - DESCRIPTORS
DESCRIPTORS: ACHING
DESCRIPTORS: ACHING;DISCOMFORT
DESCRIPTORS: ACHING

## 2020-10-03 ASSESSMENT — PAIN DESCRIPTION - LOCATION
LOCATION: CHEST
LOCATION: CHEST
LOCATION: ABDOMEN;CHEST
LOCATION: CHEST

## 2020-10-03 ASSESSMENT — PAIN DESCRIPTION - PAIN TYPE
TYPE: ACUTE PAIN

## 2020-10-03 ASSESSMENT — PAIN DESCRIPTION - DIRECTION: RADIATING_TOWARDS: NO

## 2020-10-03 ASSESSMENT — PAIN DESCRIPTION - FREQUENCY
FREQUENCY: CONTINUOUS
FREQUENCY: CONTINUOUS

## 2020-10-03 ASSESSMENT — PAIN - FUNCTIONAL ASSESSMENT: PAIN_FUNCTIONAL_ASSESSMENT: ACTIVITIES ARE NOT PREVENTED

## 2020-10-03 NOTE — PROGRESS NOTES
Hospitalist Progress Note      Patient:  Ann Marie Fajardo    Unit/Bed:8B-25/025-A  YOB: 1965  MRN: [id number]   Acct: [de-identified]   PCP: SEUN Cabrera  Date of Admission: 10/2/2020    Assessment/Plan:    1. Epigastric Abdominal Pain: Worse with eating, described as GERD. LFTs wnl, Gallbladder US with no acute findings. GI cocktail/morphine trialed with some improvement. GI consulted and recommended changing morphine to Dilaudid. GB US above. Planning EGD 10/4/20. Continue PPI/Carafate. NPO after midnight. Hold Plavix prior to EGD. 2. GERD: PPI/Carafate, plan as above. 3. Hx CAD s/p PCI (2008): Pain not exertional, not improved with OP nitro. Follows with Dr. Jimmie Márquez, Troponin remained negative. Continue ASA/statin Holding Plavix as above. Monitor. 4. Chronic HFpEF: EF 60% on most recent echo. No signs of fluid overload. Continue BB/ACEI/Lasix. 5. Chronic Normocytic Anemia: Hgb 11.2, appears relatively stable, monitor. 6. COPD/Asthma without acute exacerbation: Previously required O2, however, no longer receives it due to insurance issues. Currently on 2L via NC, continue to wean as tolerated without documentation of hypoxia. Continue home ICS. Start Tiotropium. Albuterol prn.   7. NIDDM-2: BG well controlled, holding home Metformin. 8. Hx CVA (3/2019): Residual left facial droop/left extremity weakness. Continue ASA/statin. Resume plavix when appropriate per GI. 9. DVT/PE: twenty years ago, not on [address]. Continue home aspirin, holding Plavix prior to EGD. 10. Depression/Anxiety/Fibromyalgia: Continue home Doxepin, Gabapentin, Latuda, Buspar, Lexapro. 11. HLD: Continue statin. 12. Essential HTN: Continue ACEI, BB, Lasix. 13. Obesity: Body mass index is 39.92 kg/m². 14. Tobacco Abuse: Reports smoking 5 cigarettes per day, working on actively quitting. Disposition: EGD in AM. GI Following.      Chief Complaint: Epigastric Abdominal Pain    Initial H and P:-    Rangel Son is a 48year old white male smoker with PMH of CAD s/p 1stent (2008), CVA (03/2019) with residual left facial droop and left sided weakness, GERD, NIDDM type 2, Fibromyalgia, DVT (20 years ago) not on anticoagulation, HFpEF, COPD without baseline supplmental O2 use who presented to [facility] as direct transfer from [facility] complaining of 10/10 \"really bad heart burn\" that feels like burning, aching and occasionally sharp that started around 15:00 on today after eating and then lying down. It improved while in the ED after receiving morphine but worsened again after he at in the ED. It is not exertional, and did not improve with the SL nitro/nitro drip. Associated nausea and vomiting. Denies arm/shoulder/jaw pain, denies inc SOB from baseline, lightheadedness, hematochezia, hematemesis, melena, fever.     In the ED, vitals showed: HR 94, /77, RR 23, 93% on RA on arrival but was placed on 3L for unclear reason and afebrile. Labs showsed: WBC 13.9, hgb 12.4, MCV 78, plt 414, CMP unremarkable, ddimer mildly elevated at 0.52 (0-0.49 as reference range). Troponin neg. CXR showed no acute findings. Patient was given SL nitro/nitro drip (which did not relieve the pain), zofran, heparin drip, and morphine. \"    Subjective (past 24 hours):   Patient valuated resting comfortably in his bed, talking on the phone. He appears comfortable but when asked he reported 10/10 epigastric pain and stated he felt like he needed his morphine again. States that he has had no further episodes of nausea or vomiting. Denies constipation or diarrhea. Patient ports he has chronic shortness of breath, does not use home O2 but has in the past.  Chronic dry cough without any acute chest pain. No fever or chills. Past medical history, family history, social history and allergies reviewed again and is unchanged since admission. ROS (12 point review of systems completed. Pertinent positives noted. Otherwise ROS is negative)     Medications:  Reviewed    Infusion Medications   Scheduled Medications    sodium chloride flush  10 mL Intravenous 2 times per day    aspirin  81 mg Oral Daily    atorvastatin  40 mg Oral Daily    busPIRone  15 mg Oral TID    [Held by provider] clopidogrel  75 mg Oral Daily    doxepin  100 mg Oral Nightly    escitalopram  20 mg Oral Daily    fluticasone  1 puff Inhalation BID    furosemide  20 mg Oral Daily    gabapentin  800 mg Oral TID    lisinopril  10 mg Oral Daily    lurasidone  80 mg Oral Dinner    metoprolol tartrate  12.5 mg Oral BID    pantoprazole  40 mg Oral QAM AC    sucralfate  1 g Oral 4 times per day     PRN Meds: HYDROmorphone, sodium chloride flush, acetaminophen **OR** acetaminophen, polyethylene glycol, promethazine **OR** ondansetron, GI cocktail      Intake/Output Summary (Last 24 hours) at 10/3/2020 1244  Last data filed at 10/3/2020 0823  Gross per 24 hour   Intake 0 ml   Output 275 ml   Net -275 ml       Diet:  DIET GENERAL;  Diet NPO, After Midnight    Exam:  BP (!) 99/56   Pulse 72   Temp 97.3 °F (36.3 °C) (Oral)   Resp 20   Ht 5' 4.5\" (1.638 m)   Wt 236 lb 3.2 oz (107.1 kg)   SpO2 96%   BMI 39.92 kg/m²   General appearance: 52-year-old  male. Apparent distress, appears stated age and cooperative. HEENT: Pupils equal, round, and reactive to light. Conjunctivae/corneas clear. Neck: Supple, with full range of motion. No jugular venous distention. Trachea midline. Respiratory:  Normal respiratory effort. Clear to auscultation, bilaterally without Rales/Wheezes/Rhonchi. Cardiovascular: Regular rate and rhythm with normal S1/S2 without murmurs, rubs or gallops. Abdomen: Soft, obese, nondistended. Mild TTP epigastric region. with normal bowel sounds. Musculoskeletal: passive and active ROM x 4 extremities. Skin: Skin color, texture, turgor normal.  No rashes or lesions.   Neurologic:  Neurovascularly

## 2020-10-03 NOTE — PLAN OF CARE
Problem: Impaired respiratory status  Goal: Clear lung sounds  Outcome: Ongoing   Continue MDI to improve lung sounds.
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
## 2020-10-03 NOTE — PROGRESS NOTES
Lieutenant Tarah RUBALCAVA, personally performed and participated in key or critical portions of the evaluation and management  of the patient, including personally performing an exam and determining  definitive medical decision making . I was present with the resident physician in the patient's room with the patient. I discussed the patient's care with the resident and verified the accuracy of his/her documentation in the medical record. I verified the accuracy of the documentation by the resident with the following notations:     . I did an exam, and the abdomen is benign       Impression:   billary colic  R/o pud    Recs:  egd  Us gb  Endoscopic Studies      Will follow    I spoke with the patient regarding the working diagnosis and plans.

## 2020-10-03 NOTE — PLAN OF CARE
Problem: Pain:  Goal: Pain level will decrease  Description: Pain level will decrease  Outcome: Ongoing  Note: Patient voices pain 10/10. Patients pain goal is 7/10. PRN pain medications given as ordered. Patients pain goal is a 10. Non-pharmacological interventions include: rest, reposition and TV distraction. Goal: Control of acute pain  Description: Control of acute pain  Outcome: Ongoing  Note: Patient voices pain 10/10. Patients pain goal is 7/10. PRN pain medications given as ordered. Patients pain goal is a 10. Non-pharmacological interventions include: rest, reposition and TV distraction. Goal: Control of chronic pain  Description: Control of chronic pain  Outcome: Ongoing     Problem: Safety:  Goal: Free from accidental physical injury  Description: Free from accidental physical injury  Outcome: Ongoing  Note: Patient understands safety per protocol this shift. Care plan reviewed with patient. Patient verbalizes understanding of the plan of care and contributes to goal setting.

## 2020-10-04 LAB
ANION GAP SERPL CALCULATED.3IONS-SCNC: 11 MEQ/L (ref 8–16)
BUN BLDV-MCNC: 11 MG/DL (ref 7–22)
CALCIUM SERPL-MCNC: 8.2 MG/DL (ref 8.5–10.5)
CHLORIDE BLD-SCNC: 98 MEQ/L (ref 98–111)
CO2: 23 MEQ/L (ref 23–33)
CREAT SERPL-MCNC: 0.8 MG/DL (ref 0.4–1.2)
EKG ATRIAL RATE: 81 BPM
EKG P AXIS: 51 DEGREES
EKG P-R INTERVAL: 140 MS
EKG Q-T INTERVAL: 376 MS
EKG QRS DURATION: 84 MS
EKG QTC CALCULATION (BAZETT): 436 MS
EKG R AXIS: 30 DEGREES
EKG T AXIS: 35 DEGREES
EKG VENTRICULAR RATE: 81 BPM
ERYTHROCYTE [DISTWIDTH] IN BLOOD BY AUTOMATED COUNT: 16.5 % (ref 11.5–14.5)
ERYTHROCYTE [DISTWIDTH] IN BLOOD BY AUTOMATED COUNT: 48.8 FL (ref 35–45)
GFR SERPL CREATININE-BSD FRML MDRD: > 90 ML/MIN/1.73M2
GLUCOSE BLD-MCNC: 119 MG/DL (ref 70–108)
HCT VFR BLD CALC: 33.6 % (ref 42–52)
HEMOGLOBIN: 10.8 GM/DL (ref 14–18)
MCH RBC QN AUTO: 26.4 PG (ref 26–33)
MCHC RBC AUTO-ENTMCNC: 32.1 GM/DL (ref 32.2–35.5)
MCV RBC AUTO: 82.2 FL (ref 80–94)
PLATELET # BLD: 300 THOU/MM3 (ref 130–400)
PMV BLD AUTO: 8.9 FL (ref 9.4–12.4)
POTASSIUM SERPL-SCNC: 4 MEQ/L (ref 3.5–5.2)
RBC # BLD: 4.09 MILL/MM3 (ref 4.7–6.1)
SODIUM BLD-SCNC: 132 MEQ/L (ref 135–145)
WBC # BLD: 7.6 THOU/MM3 (ref 4.8–10.8)

## 2020-10-04 PROCEDURE — 85027 COMPLETE CBC AUTOMATED: CPT

## 2020-10-04 PROCEDURE — 3609012400 HC EGD TRANSORAL BIOPSY SINGLE/MULTIPLE: Performed by: INTERNAL MEDICINE

## 2020-10-04 PROCEDURE — 80048 BASIC METABOLIC PNL TOTAL CA: CPT

## 2020-10-04 PROCEDURE — 0DB58ZX EXCISION OF ESOPHAGUS, VIA NATURAL OR ARTIFICIAL OPENING ENDOSCOPIC, DIAGNOSTIC: ICD-10-PCS | Performed by: INTERNAL MEDICINE

## 2020-10-04 PROCEDURE — 6370000000 HC RX 637 (ALT 250 FOR IP): Performed by: PHYSICIAN ASSISTANT

## 2020-10-04 PROCEDURE — 6360000002 HC RX W HCPCS: Performed by: INTERNAL MEDICINE

## 2020-10-04 PROCEDURE — 88342 IMHCHEM/IMCYTCHM 1ST ANTB: CPT

## 2020-10-04 PROCEDURE — 99232 SBSQ HOSP IP/OBS MODERATE 35: CPT | Performed by: PHYSICIAN ASSISTANT

## 2020-10-04 PROCEDURE — 6360000002 HC RX W HCPCS: Performed by: STUDENT IN AN ORGANIZED HEALTH CARE EDUCATION/TRAINING PROGRAM

## 2020-10-04 PROCEDURE — 99152 MOD SED SAME PHYS/QHP 5/>YRS: CPT | Performed by: INTERNAL MEDICINE

## 2020-10-04 PROCEDURE — 1200000003 HC TELEMETRY R&B

## 2020-10-04 PROCEDURE — 94640 AIRWAY INHALATION TREATMENT: CPT

## 2020-10-04 PROCEDURE — 36415 COLL VENOUS BLD VENIPUNCTURE: CPT

## 2020-10-04 PROCEDURE — 93010 ELECTROCARDIOGRAM REPORT: CPT | Performed by: INTERNAL MEDICINE

## 2020-10-04 PROCEDURE — 6360000002 HC RX W HCPCS: Performed by: PHYSICIAN ASSISTANT

## 2020-10-04 PROCEDURE — 0DB68ZX EXCISION OF STOMACH, VIA NATURAL OR ARTIFICIAL OPENING ENDOSCOPIC, DIAGNOSTIC: ICD-10-PCS | Performed by: INTERNAL MEDICINE

## 2020-10-04 PROCEDURE — 2709999900 HC NON-CHARGEABLE SUPPLY: Performed by: INTERNAL MEDICINE

## 2020-10-04 PROCEDURE — 2580000003 HC RX 258: Performed by: PHYSICIAN ASSISTANT

## 2020-10-04 PROCEDURE — 88305 TISSUE EXAM BY PATHOLOGIST: CPT

## 2020-10-04 PROCEDURE — 94761 N-INVAS EAR/PLS OXIMETRY MLT: CPT

## 2020-10-04 PROCEDURE — 2700000000 HC OXYGEN THERAPY PER DAY

## 2020-10-04 PROCEDURE — 2720000010 HC SURG SUPPLY STERILE: Performed by: INTERNAL MEDICINE

## 2020-10-04 RX ORDER — FENTANYL CITRATE 50 UG/ML
INJECTION, SOLUTION INTRAMUSCULAR; INTRAVENOUS
Status: DISPENSED
Start: 2020-10-04 | End: 2020-10-04

## 2020-10-04 RX ORDER — MIDAZOLAM HYDROCHLORIDE 1 MG/ML
INJECTION INTRAMUSCULAR; INTRAVENOUS PRN
Status: DISCONTINUED | OUTPATIENT
Start: 2020-10-04 | End: 2020-10-04 | Stop reason: ALTCHOICE

## 2020-10-04 RX ORDER — ACETAMINOPHEN 325 MG/1
650 TABLET ORAL EVERY 6 HOURS PRN
Status: DISCONTINUED | OUTPATIENT
Start: 2020-10-04 | End: 2020-10-05 | Stop reason: HOSPADM

## 2020-10-04 RX ORDER — SODIUM CHLORIDE 9 MG/ML
INJECTION, SOLUTION INTRAVENOUS CONTINUOUS
Status: ACTIVE | OUTPATIENT
Start: 2020-10-04 | End: 2020-10-04

## 2020-10-04 RX ORDER — MIDAZOLAM HYDROCHLORIDE 1 MG/ML
INJECTION INTRAMUSCULAR; INTRAVENOUS
Status: DISPENSED
Start: 2020-10-04 | End: 2020-10-04

## 2020-10-04 RX ORDER — FENTANYL CITRATE 50 UG/ML
INJECTION, SOLUTION INTRAMUSCULAR; INTRAVENOUS PRN
Status: DISCONTINUED | OUTPATIENT
Start: 2020-10-04 | End: 2020-10-04 | Stop reason: ALTCHOICE

## 2020-10-04 RX ORDER — ACETAMINOPHEN 650 MG/1
650 SUPPOSITORY RECTAL EVERY 6 HOURS PRN
Status: DISCONTINUED | OUTPATIENT
Start: 2020-10-04 | End: 2020-10-05 | Stop reason: HOSPADM

## 2020-10-04 RX ORDER — SODIUM CHLORIDE 9 MG/ML
INJECTION, SOLUTION INTRAVENOUS CONTINUOUS
Status: DISCONTINUED | OUTPATIENT
Start: 2020-10-04 | End: 2020-10-04

## 2020-10-04 RX ADMIN — GABAPENTIN 800 MG: 400 CAPSULE ORAL at 13:19

## 2020-10-04 RX ADMIN — HYDROMORPHONE HYDROCHLORIDE 0.5 MG: 1 INJECTION, SOLUTION INTRAMUSCULAR; INTRAVENOUS; SUBCUTANEOUS at 00:43

## 2020-10-04 RX ADMIN — HYDROMORPHONE HYDROCHLORIDE 0.5 MG: 1 INJECTION, SOLUTION INTRAMUSCULAR; INTRAVENOUS; SUBCUTANEOUS at 20:35

## 2020-10-04 RX ADMIN — LURASIDONE HYDROCHLORIDE 80 MG: 40 TABLET, FILM COATED ORAL at 20:35

## 2020-10-04 RX ADMIN — HYDROMORPHONE HYDROCHLORIDE 0.5 MG: 1 INJECTION, SOLUTION INTRAMUSCULAR; INTRAVENOUS; SUBCUTANEOUS at 12:52

## 2020-10-04 RX ADMIN — Medication 10 ML: at 10:18

## 2020-10-04 RX ADMIN — SUCRALFATE 1 G: 1 TABLET ORAL at 17:20

## 2020-10-04 RX ADMIN — METOPROLOL TARTRATE 12.5 MG: 25 TABLET ORAL at 20:35

## 2020-10-04 RX ADMIN — GABAPENTIN 800 MG: 400 CAPSULE ORAL at 20:35

## 2020-10-04 RX ADMIN — DOXEPIN HYDROCHLORIDE 100 MG: 50 CAPSULE ORAL at 20:35

## 2020-10-04 RX ADMIN — SUCRALFATE 1 G: 1 TABLET ORAL at 12:51

## 2020-10-04 RX ADMIN — ASPIRIN 81 MG: 81 TABLET, CHEWABLE ORAL at 10:17

## 2020-10-04 RX ADMIN — ALBUTEROL SULFATE 2.5 MG: 2.5 SOLUTION RESPIRATORY (INHALATION) at 21:32

## 2020-10-04 RX ADMIN — FLUTICASONE PROPIONATE 1 PUFF: 110 AEROSOL, METERED RESPIRATORY (INHALATION) at 17:13

## 2020-10-04 RX ADMIN — ATORVASTATIN CALCIUM 40 MG: 40 TABLET, FILM COATED ORAL at 10:17

## 2020-10-04 RX ADMIN — BUSPIRONE HYDROCHLORIDE 15 MG: 7.5 TABLET ORAL at 20:35

## 2020-10-04 RX ADMIN — Medication 10 ML: at 20:38

## 2020-10-04 RX ADMIN — TIOTROPIUM BROMIDE INHALATION SPRAY 2 PUFF: 3.12 SPRAY, METERED RESPIRATORY (INHALATION) at 12:50

## 2020-10-04 RX ADMIN — GABAPENTIN 800 MG: 400 CAPSULE ORAL at 10:17

## 2020-10-04 RX ADMIN — LISINOPRIL 10 MG: 10 TABLET ORAL at 10:17

## 2020-10-04 RX ADMIN — METOPROLOL TARTRATE 12.5 MG: 25 TABLET ORAL at 10:17

## 2020-10-04 RX ADMIN — ESCITALOPRAM 20 MG: 20 TABLET, FILM COATED ORAL at 10:17

## 2020-10-04 RX ADMIN — BUSPIRONE HYDROCHLORIDE 15 MG: 7.5 TABLET ORAL at 10:17

## 2020-10-04 RX ADMIN — SUCRALFATE 1 G: 1 TABLET ORAL at 00:43

## 2020-10-04 RX ADMIN — HYDROMORPHONE HYDROCHLORIDE 0.5 MG: 1 INJECTION, SOLUTION INTRAMUSCULAR; INTRAVENOUS; SUBCUTANEOUS at 06:45

## 2020-10-04 RX ADMIN — BUSPIRONE HYDROCHLORIDE 15 MG: 7.5 TABLET ORAL at 13:19

## 2020-10-04 ASSESSMENT — PAIN SCALES - GENERAL
PAINLEVEL_OUTOF10: 0
PAINLEVEL_OUTOF10: 5
PAINLEVEL_OUTOF10: 10
PAINLEVEL_OUTOF10: 0
PAINLEVEL_OUTOF10: 9
PAINLEVEL_OUTOF10: 7
PAINLEVEL_OUTOF10: 10

## 2020-10-04 ASSESSMENT — PAIN - FUNCTIONAL ASSESSMENT
PAIN_FUNCTIONAL_ASSESSMENT: ACTIVITIES ARE NOT PREVENTED
PAIN_FUNCTIONAL_ASSESSMENT: 0-10
PAIN_FUNCTIONAL_ASSESSMENT: ACTIVITIES ARE NOT PREVENTED
PAIN_FUNCTIONAL_ASSESSMENT: ACTIVITIES ARE NOT PREVENTED

## 2020-10-04 ASSESSMENT — PAIN DESCRIPTION - LOCATION
LOCATION: ABDOMEN

## 2020-10-04 ASSESSMENT — PAIN DESCRIPTION - FREQUENCY
FREQUENCY: CONTINUOUS

## 2020-10-04 ASSESSMENT — PAIN DESCRIPTION - PROGRESSION
CLINICAL_PROGRESSION: NOT CHANGED

## 2020-10-04 ASSESSMENT — PAIN DESCRIPTION - PAIN TYPE
TYPE: ACUTE PAIN

## 2020-10-04 ASSESSMENT — PAIN DESCRIPTION - ONSET
ONSET: ON-GOING

## 2020-10-04 ASSESSMENT — PAIN DESCRIPTION - DESCRIPTORS
DESCRIPTORS: ACHING
DESCRIPTORS: BURNING
DESCRIPTORS: STABBING;THROBBING

## 2020-10-04 ASSESSMENT — PAIN DESCRIPTION - ORIENTATION
ORIENTATION: MID;UPPER
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
## 2020-10-04 NOTE — PLAN OF CARE
Problem: Impaired respiratory status  Goal: Clear lung sounds  10/4/2020 1254 by Ana Higgins RCP  Outcome: Ongoing  Note: Pt continues on MDI's for maintenance of COPD and pt does MDI at home.

## 2020-10-04 NOTE — POST SEDATION
[facility]  Sedation/Analgesia Post Sedation Record    Patient: Perla Zimmer : 1965  [facility] Rec#: [id number] Acc#: [id number]   Procedure Performed By: Bryan Grullon  Primary Care Physician: SEUN Martinez    POST-PROCEDURE    Physicians/Assistants: Bryan Grullon  Procedure Performed:    Sedation/Anesthesia:     Estimated Blood Loss:          ml  Specimens Removed:  []None [x]Other:      Disposition of Specimen:  [x]Pathology []Other      Complications:   [x]None Immediate []Other:     Post-procedure Diagnosis/Findings:             Recommendations:      gastritis         Bryan Grullon MD [facility]  Electronically signed 10/4/2020 at 9:17 AM

## 2020-10-04 NOTE — PROGRESS NOTES
Pt in phase 2. Denies pain. Family at bedside. Dr. Julianna Andrew spoke to family. Vitals charted. Report called to 8B nurse.

## 2020-10-04 NOTE — PROGRESS NOTES
Liseth Diego is here for egd. Scope used GIF  571. Pictures taken. Cold forceps biopsies taken and placed in 2 jars. Procedure completed and he tolerated well.

## 2020-10-04 NOTE — PLAN OF CARE
Problem: Pain:  Goal: Pain level will decrease  Description: Pain level will decrease  Outcome: Ongoing  Note: Pt rated epigastric pain 5 on scale 0-10. Dilaudid on MAR. Pain goal is 3. Pt repositioned for comfort. Problem: Safety:  Goal: Free from accidental physical injury  Description: Free from accidental physical injury  Outcome: Ongoing  Note: Pt free from accidental physical injury this shift. Bed alarm on, 2/4 side rails up, call light in reach, fall band on, nonskid socks on, clear pathway, bed in locked and lowest position. Will continue to monitor. Problem: Impaired respiratory status  Goal: Clear lung sounds  Outcome: Ongoing  Note: Lung sounds clear and diminished throughout. Problem: Falls - Risk of:  Goal: Will remain free from falls  Description: Will remain free from falls  Outcome: Ongoing  Note: Pt free from falls this shift. Bed alarm on, 2/4 side rails up, call light in reach, fall band on, nonskid socks on, clear pathway, bed in locked and lowest position. Will continue to monitor. Goal: Absence of physical injury  Description: Absence of physical injury  Outcome: Ongoing  Note: Pt free from physical injury this shift. Bed alarm on, 2/4 side rails up, call light in reach, fall band on, nonskid socks on, clear pathway, bed in locked and lowest position. Will continue to monitor. Problem: Skin Integrity:  Goal: Will show no infection signs and symptoms  Description: Will show no infection signs and symptoms  Outcome: Ongoing  Note:   Vitals:    10/04/20 0324   BP: 123/68   Pulse: 81   Resp: 18   Temp: 98.2 °F (36.8 °C)   SpO2: 97%     Pt afebrile. No signs or symptoms of infection noted this shift. Will continue to monitor. Goal: Absence of new skin breakdown  Description: Absence of new skin breakdown  Outcome: Ongoing  Note: No new signs of skin breakdown noted this shift. Pt is continent and able to turn self. Will continue to monitor. Care plan reviewed with patient.

## 2020-10-04 NOTE — OP NOTE
[address] [place] [address]                                OPERATIVE REPORT    PATIENT NAME: Wen Wilkerson                     :        1965  MED REC NO:   [id number]                           ROOM:       0025  ACCOUNT NO:   [de-identified]                           ADMIT DATE: 10/02/2020  PROVIDER:     SATISH Tiptonaurora HurtBethany OF PROCEDURE:  10/04/2020    PROCEDURE:  EDG plus biopsy. SURGEON:  Cassidy Taylor MD    INDICATION FOR THE PROCEDURE:  The patient with a history of epigastric  pain, nausea, vomiting. See the note from yesterday and preop note for  rest of clinicals. ASA CLASSIFICATION:  III. MEDICATIONS:  Versed 2.5 and fentanyl 50. BIOPSIES:  Yes. PHOTOGRAPHS:  Yes. BLOOD LOSS:  5 mL. The start time was 0908 and finished time is 923. DESCRIPTION OF PROCEDURE:  Informed consent was obtained after  explaining risks and benefits of the procedure and conscious sedation. Possible complications including bleeding, perforation, reaction to  medicine, but not limiting to death, were discussed. Afterwards, GIF-180 gastroscope was advanced through oropharynx,  esophagus, stomach, into the duodenum. Normal-looking duodenal bulb and  second portion of mucosa looked healthy, some old food seen in the small  bowel, but in the stomach, there was a lot more food sitting and some of  the exam was compromised. In the antrum, there was gastritis, but no  peptic ulcer disease. Retroflex exam again was compromised with the  foods, where a limited exam was done. Lower esophageal incompetence. Scope was withdrawn. Distal esophagitis. Biopsies were taken. Scope  was withdrawn. The patient tolerated the procedure well. IMPRESSION:  1.  Old food in the stomach compromising the exam.  2.  Gastric dysmotility, component of gastroparesis from diabetes. 3.  The patient's history suggestive of biliary colic. The patient had  normal ultrasound. We will do the CCK-HIDA scan and I am going to get  the patient some clear liquids.   I had a good discussion with the  patient and family after the Claudette Sorrel, M.D.    D: 10/04/2020 9:34:36       T: 10/04/2020 9:59:22     CARLEEN/[email or web address]  Job#: [id number]     Doc#: [id number]    CC:  Family Physician

## 2020-10-04 NOTE — PLAN OF CARE
Problem: Pain:  Goal: Pain level will decrease  Description: Pain level will decrease  10/4/2020 0920 by Geri Berry RN  Outcome: Ongoing  Note: PRN medications     Problem: Pain:  Goal: Control of acute pain  Description: Control of acute pain  Outcome: Ongoing  Note: PRN medication     Problem: Pain:  Goal: Control of chronic pain  Description: Control of chronic pain  Outcome: Ongoing  Note: PRN medication     Problem: Safety:  Goal: Free from accidental physical injury  Description: Free from accidental physical injury  10/4/2020 0920 by Geri Berry RN  Outcome: Ongoing  Note: Free from injury. Problem: Impaired respiratory status  Goal: Clear lung sounds  10/4/2020 0920 by Geri Berry RN  Outcome: Ongoing  Note: See assessment     Problem: Falls - Risk of:  Goal: Will remain free from falls  Description: Will remain free from falls  10/4/2020 0920 by Geri Berry RN  Outcome: Ongoing  Note: Free from falls. Problem: Falls - Risk of:  Goal: Absence of physical injury  Description: Absence of physical injury  10/4/2020 0920 by Geri Berry RN  Outcome: Ongoing  Note: Free from injury. Problem: Skin Integrity:  Goal: Will show no infection signs and symptoms  Description: Will show no infection signs and symptoms  10/4/2020 0920 by Geri Berry RN  Outcome: Ongoing  Note: See assessment      Problem: Skin Integrity:  Goal: Absence of new skin breakdown  Description: Absence of new skin breakdown  10/4/2020 0920 by Geri Berry RN  Outcome: Ongoing  Note: See assessment    Care plan reviewed with patient. Patient verbalize understanding of the plan of care and contribute to goal setting.

## 2020-10-04 NOTE — PLAN OF CARE
Problem: Impaired respiratory status  Goal: Clear lung sounds  10/4/2020 1720 by Pallavi Carrizales RCP  Outcome: Ongoing   Continue MDI as ordered to improve aeration.

## 2020-10-04 NOTE — PROGRESS NOTES
social history and allergies reviewed again and is unchanged since admission. ROS (12 point review of systems completed. Pertinent positives noted. Otherwise ROS is negative)     Medications:  Reviewed    Infusion Medications    sodium chloride 75 mL/hr at 10/04/20 1033     Scheduled Medications    midazolam        fentaNYL        tiotropium  2 puff Inhalation Daily    sodium chloride flush  10 mL Intravenous 2 times per day    aspirin  81 mg Oral Daily    atorvastatin  40 mg Oral Daily    busPIRone  15 mg Oral TID    [Held by provider] clopidogrel  75 mg Oral Daily    doxepin  100 mg Oral Nightly    escitalopram  20 mg Oral Daily    fluticasone  1 puff Inhalation BID    [Held by provider] furosemide  20 mg Oral Daily    gabapentin  800 mg Oral TID    lisinopril  10 mg Oral Daily    lurasidone  80 mg Oral Dinner    metoprolol tartrate  12.5 mg Oral BID    pantoprazole  40 mg Oral QAM AC    sucralfate  1 g Oral 4 times per day     PRN Meds: HYDROmorphone, albuterol, sodium chloride flush, acetaminophen **OR** acetaminophen, polyethylene glycol, promethazine **OR** ondansetron, GI cocktail      Intake/Output Summary (Last 24 hours) at 10/4/2020 1239  Last data filed at 10/4/2020 1018  Gross per 24 hour   Intake 610 ml   Output 1225 ml   Net -615 ml       Diet:  DIET CLEAR LIQUID;    Exam:  /68   Pulse 72   Temp 98.2 °F (36.8 °C) (Oral)   Resp 18   Ht 5' 4.5\" (1.638 m)   Wt 234 lb 12.8 oz (106.5 kg)   SpO2 95%   BMI 39.68 kg/m²   General appearance: 28-year-old  male. Apparent distress, appears stated age and cooperative. HEENT: Pupils equal, round, and reactive to light. Conjunctivae/corneas clear. Neck: Supple, with full range of motion. No jugular venous distention. Trachea midline. Respiratory:  Normal respiratory effort. Clear to auscultation, bilaterally without Rales/Wheezes/Rhonchi.   Cardiovascular: Regular rate and rhythm with normal S1/S2 without murmurs, rubs

## 2020-10-05 ENCOUNTER — APPOINTMENT (OUTPATIENT)
Dept: NUCLEAR MEDICINE | Age: 55
DRG: 445 | End: 2020-10-05
Attending: FAMILY MEDICINE
Payer: MEDICARE

## 2020-10-05 VITALS
TEMPERATURE: 98.1 F | HEIGHT: 65 IN | SYSTOLIC BLOOD PRESSURE: 148 MMHG | DIASTOLIC BLOOD PRESSURE: 63 MMHG | WEIGHT: 234.8 LBS | HEART RATE: 68 BPM | OXYGEN SATURATION: 96 % | RESPIRATION RATE: 18 BRPM | BODY MASS INDEX: 39.12 KG/M2

## 2020-10-05 LAB
ANION GAP SERPL CALCULATED.3IONS-SCNC: 11 MEQ/L (ref 8–16)
BUN BLDV-MCNC: 7 MG/DL (ref 7–22)
CALCIUM SERPL-MCNC: 8.6 MG/DL (ref 8.5–10.5)
CHLORIDE BLD-SCNC: 100 MEQ/L (ref 98–111)
CO2: 25 MEQ/L (ref 23–33)
CREAT SERPL-MCNC: 0.9 MG/DL (ref 0.4–1.2)
ERYTHROCYTE [DISTWIDTH] IN BLOOD BY AUTOMATED COUNT: 16.5 % (ref 11.5–14.5)
ERYTHROCYTE [DISTWIDTH] IN BLOOD BY AUTOMATED COUNT: 48.4 FL (ref 35–45)
GFR SERPL CREATININE-BSD FRML MDRD: 88 ML/MIN/1.73M2
GLUCOSE BLD-MCNC: 100 MG/DL (ref 70–108)
HCT VFR BLD CALC: 35.2 % (ref 42–52)
HEMOGLOBIN: 11.3 GM/DL (ref 14–18)
MCH RBC QN AUTO: 26.3 PG (ref 26–33)
MCHC RBC AUTO-ENTMCNC: 32.1 GM/DL (ref 32.2–35.5)
MCV RBC AUTO: 81.9 FL (ref 80–94)
ORGANISM: ABNORMAL
PLATELET # BLD: 310 THOU/MM3 (ref 130–400)
PMV BLD AUTO: 8.7 FL (ref 9.4–12.4)
POTASSIUM SERPL-SCNC: 3.9 MEQ/L (ref 3.5–5.2)
RBC # BLD: 4.3 MILL/MM3 (ref 4.7–6.1)
SODIUM BLD-SCNC: 136 MEQ/L (ref 135–145)
URINE CULTURE REFLEX: ABNORMAL
WBC # BLD: 9.5 THOU/MM3 (ref 4.8–10.8)

## 2020-10-05 PROCEDURE — 85027 COMPLETE CBC AUTOMATED: CPT

## 2020-10-05 PROCEDURE — 6370000000 HC RX 637 (ALT 250 FOR IP): Performed by: PHYSICIAN ASSISTANT

## 2020-10-05 PROCEDURE — A9537 TC99M MEBROFENIN: HCPCS | Performed by: INTERNAL MEDICINE

## 2020-10-05 PROCEDURE — 6360000002 HC RX W HCPCS: Performed by: PHYSICIAN ASSISTANT

## 2020-10-05 PROCEDURE — 99238 HOSP IP/OBS DSCHRG MGMT 30/<: CPT | Performed by: PHYSICIAN ASSISTANT

## 2020-10-05 PROCEDURE — 94640 AIRWAY INHALATION TREATMENT: CPT

## 2020-10-05 PROCEDURE — 94760 N-INVAS EAR/PLS OXIMETRY 1: CPT

## 2020-10-05 PROCEDURE — 2580000003 HC RX 258: Performed by: PHYSICIAN ASSISTANT

## 2020-10-05 PROCEDURE — 78227 HEPATOBIL SYST IMAGE W/DRUG: CPT

## 2020-10-05 PROCEDURE — 80048 BASIC METABOLIC PNL TOTAL CA: CPT

## 2020-10-05 PROCEDURE — 6360000002 HC RX W HCPCS: Performed by: INTERNAL MEDICINE

## 2020-10-05 PROCEDURE — 3430000000 HC RX DIAGNOSTIC RADIOPHARMACEUTICAL: Performed by: INTERNAL MEDICINE

## 2020-10-05 PROCEDURE — 36415 COLL VENOUS BLD VENIPUNCTURE: CPT

## 2020-10-05 RX ORDER — SUCRALFATE 1 G/1
1 TABLET ORAL 4 TIMES DAILY
Qty: 120 TABLET | Refills: 3 | Status: SHIPPED | OUTPATIENT
Start: 2020-10-05

## 2020-10-05 RX ADMIN — ATORVASTATIN CALCIUM 40 MG: 40 TABLET, FILM COATED ORAL at 08:35

## 2020-10-05 RX ADMIN — BUSPIRONE HYDROCHLORIDE 15 MG: 7.5 TABLET ORAL at 12:56

## 2020-10-05 RX ADMIN — GABAPENTIN 800 MG: 400 CAPSULE ORAL at 08:35

## 2020-10-05 RX ADMIN — BUSPIRONE HYDROCHLORIDE 15 MG: 7.5 TABLET ORAL at 08:35

## 2020-10-05 RX ADMIN — SUCRALFATE 1 G: 1 TABLET ORAL at 12:56

## 2020-10-05 RX ADMIN — ESCITALOPRAM 20 MG: 20 TABLET, FILM COATED ORAL at 08:35

## 2020-10-05 RX ADMIN — Medication 10 ML: at 08:35

## 2020-10-05 RX ADMIN — METOPROLOL TARTRATE 12.5 MG: 25 TABLET ORAL at 08:34

## 2020-10-05 RX ADMIN — LISINOPRIL 10 MG: 10 TABLET ORAL at 08:34

## 2020-10-05 RX ADMIN — HYDROMORPHONE HYDROCHLORIDE 0.5 MG: 1 INJECTION, SOLUTION INTRAMUSCULAR; INTRAVENOUS; SUBCUTANEOUS at 03:29

## 2020-10-05 RX ADMIN — FUROSEMIDE 20 MG: 20 TABLET ORAL at 08:35

## 2020-10-05 RX ADMIN — CLOPIDOGREL BISULFATE 75 MG: 75 TABLET ORAL at 08:34

## 2020-10-05 RX ADMIN — SUCRALFATE 1 G: 1 TABLET ORAL at 03:28

## 2020-10-05 RX ADMIN — TIOTROPIUM BROMIDE INHALATION SPRAY 2 PUFF: 3.12 SPRAY, METERED RESPIRATORY (INHALATION) at 07:57

## 2020-10-05 RX ADMIN — PANTOPRAZOLE SODIUM 40 MG: 40 TABLET, DELAYED RELEASE ORAL at 03:27

## 2020-10-05 RX ADMIN — SINCALIDE 2.13 MCG: 5 INJECTION, POWDER, LYOPHILIZED, FOR SOLUTION INTRAVENOUS at 10:45

## 2020-10-05 RX ADMIN — ASPIRIN 81 MG: 81 TABLET, CHEWABLE ORAL at 08:34

## 2020-10-05 RX ADMIN — GABAPENTIN 800 MG: 400 CAPSULE ORAL at 12:56

## 2020-10-05 RX ADMIN — Medication 9.7 MILLICURIE: at 09:30

## 2020-10-05 RX ADMIN — FLUTICASONE PROPIONATE 1 PUFF: 110 AEROSOL, METERED RESPIRATORY (INHALATION) at 07:58

## 2020-10-05 ASSESSMENT — PAIN SCALES - GENERAL
PAINLEVEL_OUTOF10: 9
PAINLEVEL_OUTOF10: 10

## 2020-10-05 NOTE — PLAN OF CARE
Problem: Pain:  Goal: Pain level will decrease  Description: Pain level will decrease  10/5/2020 0951 by Benton Covarrubias RN  Outcome: Ongoing  Note: PRN medications     Problem: Pain:  Goal: Control of acute pain  Description: Control of acute pain  10/5/2020 0951 by Benton Covarrubias RN  Outcome: Ongoing  Note: PRN medications     Problem: Pain:  Goal: Control of chronic pain  Description: Control of chronic pain  10/5/2020 0951 by Benton Covarrubias RN  Outcome: Ongoing  Note: PRN medications     Problem: Safety:  Goal: Free from accidental physical injury  Description: Free from accidental physical injury  10/5/2020 0951 by Benton Covarrubias RN  Outcome: Ongoing  Note: Free from injury. Problem: Impaired respiratory status  Goal: Clear lung sounds  10/5/2020 0951 by Benton Covarrubias RN  Outcome: Ongoing  Note: See assessment     Problem: Falls - Risk of:  Goal: Will remain free from falls  Description: Will remain free from falls  10/5/2020 0951 by Benton Covarrubias RN  Outcome: Ongoing  Note: Free from falls. Problem: Falls - Risk of:  Goal: Absence of physical injury  Description: Absence of physical injury  10/5/2020 0951 by Benton Covarrubias RN  Outcome: Ongoing  Note: Free from injury. Problem: Skin Integrity:  Goal: Will show no infection signs and symptoms  Description: Will show no infection signs and symptoms  10/5/2020 0951 by Benton Covarrubias RN  Outcome: Ongoing  Note: Afebrile. Problem: Skin Integrity:  Goal: Absence of new skin breakdown  Description: Absence of new skin breakdown  10/5/2020 0951 by Benton Covarrubias RN  Outcome: Ongoing  Note: No new skin breakdown      Care plan reviewed with patient. Patient verbalize understanding of the plan of care and contribute to goal setting.

## 2020-10-05 NOTE — PROGRESS NOTES
A home oxygen evaluation has been completed. []Patient is an inpatient. It is expected that the patient will be discharged within the next 48 hours. Qualified provider to write order for home prescription if patient qualifies. Social service/care managers will arrange for home oxygen. If patient is active, arrange for Home Medical supplier to assess for Oxygen Conserving Device per pulse oximetry. []Patient is an outpatient. Results will be faxed to the ordering provider. Qualified provider to write order for home prescription if patient qualifies and arranges for home oxygen. Patient was placed on room air for 10 minutes. SpO2 was  98% on room air at rest.  Patient can ambulate for exercise flow rate. Patients was ambulated, SpO2 was 98% on room air pt does not need oxygen for exercise        Note: For any SpO2 at 42% see policy and procedure for possible qualifications.

## 2020-10-05 NOTE — PROGRESS NOTES
Gastroenterology Progress Note:     Patient Name:  Scott Sanchez   MRN: [id number]  [id number]  YOB: 1965  Admit Date: 10/2/2020  6:21 PM  Primary Care Physician: Laurita Pena   [id number]     Patient seen and examined. 24 hours events and chart reviewed. Subjective: Patient resting in bed. He continues to request pain medication frequently for abdominal pain. Denies nausea & vomiting. He is being discharged. Objective:  BP (!) 148/63   Pulse 68   Temp 98.1 °F (36.7 °C) (Oral)   Resp 18   Ht 5' 4.5\" (1.638 m)   Wt 234 lb 12.8 oz (106.5 kg)   SpO2 96%   BMI 39.68 kg/m²     Physical Exam:    General:  Nourished in no distress  HEENT: Atraumatic, normocephalic. Moist oral mucous membranes. Neck: Supple without adenopathy, JVD, thyromegaly or masses. Trachea midline. CV: Heart RRR, no murmurs, rubs, gallops. Resp: Even, easy without cough or accessory use. Lungs clear to ascultation bilaterally. Abd: Round, soft, obese, tender to epigastrium with palpation. No hepatosplenomegaly or mass present. Active bowel sounds heard. No distention noted. Ext:  Without cyanosis, clubbing, edema. Skin: Pink, warm, dry  Neuro:  Alert, oriented x 3 with no obvious deficits.        Rectal: deferred    Labs:   CBC:   Lab Results   Component Value Date    WBC 9.5 10/05/2020    HGB 11.3 10/05/2020    HCT 35.2 10/05/2020    MCV 81.9 10/05/2020     10/05/2020     BMP:   Lab Results   Component Value Date     10/05/2020    K 3.9 10/05/2020    K 4.1 10/03/2020     10/05/2020    CO2 25 10/05/2020    PHOS 5.3 12/03/2011    BUN 7 10/05/2020    CREATININE 0.9 10/05/2020    CALCIUM 8.6 10/05/2020     PT/INR:   Lab Results   Component Value Date    PROTIME 1.16 12/02/2011    INR 1.08 10/02/2020     Lipids:   Lab Results   Component Value Date    ALKPHOS 96 10/02/2020    ALT 34 10/02/2020    AST 38 10/02/2020    BILITOT 0.3 10/02/2020    BILIDIR 0.1 12/02/2011    LABALBU 3.8 10/02/2020 reviewed in collaboration with Dr. Enrrique Burgos  Electronically signed by JESSICA Gonzalez CNP on 10/5/2020 at 9:51 AM    [facility]

## 2020-10-05 NOTE — PLAN OF CARE
Problem: Pain:  Goal: Pain level will decrease  Description: Pain level will decrease  Outcome: Ongoing  Note: Patient continues to c/o epigastric, abdominal pain rating 10/10. PRN medications given per MAR orders. Non-pharmacological pain interventions include, rest, repositioning, pillow support. Pt satisfied with pain control at this time. Goal: Control of acute pain  Description: Control of acute pain  Outcome: Ongoing  Note: See charting and assessment. Goal: Control of chronic pain  Description: Control of chronic pain  Outcome: Ongoing  Note: C/o chronic fibromyalgia pain. Problem: Safety:  Goal: Free from accidental physical injury  Description: Free from accidental physical injury  Outcome: Ongoing  Note:   Patient free from falls this shift. Patient continues on falling star program. Encouraged patient to wear non-skid slippers when ambulating. Patient is 1-2 assist. Side rails up x2. Continuing hourly checks, 5 p's monitored. Uses call light appropriately. Call light within reach. Problem: Impaired respiratory status  Goal: Clear lung sounds  10/5/2020 0517 by Ki Mcintosh RN  Outcome: Ongoing  Note: Lungs clear upon ascultation. Continues on O2 NC 1L. Needs home O2 eval prior to discharge. 10/4/2020 1720 by Bryan Otto RCP  Outcome: Ongoing     Problem: Falls - Risk of:  Goal: Will remain free from falls  Description: Will remain free from falls  Outcome: Ongoing  Note:   Patient free from falls this shift. Patient continues on falling star program. Encouraged patient to wear non-skid slippers when ambulating. Patient is 1-2 assist. Side rails up x2. Continuing hourly checks, 5 p's monitored. Uses call light appropriately. Call light within reach. Goal: Absence of physical injury  Description: Absence of physical injury  Outcome: Ongoing  Note: Free from physical injury this shift. Will continue to monitor safety status.      Problem: Skin Integrity:  Goal: Will show no

## 2020-10-05 NOTE — DISCHARGE SUMMARY
Hospitalist Discharge Summary        Patient: Linda Ledezma  YOB: 1965  MRN: [id number]   Acct: [de-identified]    Primary Care Physician: SEUN Teague    Admit date  10/2/2020    Discharge date:  10/5/2020 1:21 PM    Chief Complaint on presentation :-  Epigastric Abdominal Pain    Discharge Assessment and Plan:-   1. Gastritis/Gastroparesis/GERD: Abd pain worse with eating, GI cocktail/morphine with some improvement. EGD 10/4/2020 with gastritis, retained food suggesting possible gastroparesis. Will continue PPI/Carafate, discussed gastroparesis diet. Appreciate GI assistance. 2. Biliary Colic: Worse with eating, described as GERD. LFTs wnl, Gallbladder US with no acute findings. GI cocktail/morphine trialed with some improvement. 1. GI consulted. EGD 10/4/20 with gastritis. HIDA scan 10/5/20 with EF 32%, suggestive of chronic biliary dyskinesia. GI recommending continuing PPI/Carafate. To f/u outpatient. 3. GERD: PPI/Carafate, plan as above. 4. Hx CAD s/p PCI (2008): Pain not exertional, not improved with OP nitro. Follows with Dr. Micheal Allen, Troponin remained negative. Continue ASA/statin/Plavix. 5. Chronic HFpEF: EF 60% on most recent echo. No signs of fluid overload. Continue BB/ACEI/Lasix. 6. Mild Hyponatremia: Likely 2/2 dehydration with poor PO intake. 7. Chronic Normocytic Anemia: Hgb stable. 8. COPD/Asthma without acute exacerbation: Previously required O2, however, no longer receives it due to insurance issues. Continue home ICS. Start Tiotropium. Albuterol prn.   1. Home O2 eval with no O2 needs. 9. NIDDM-2: BG well controlled, continue Metformin. 10. Hx CVA (3/2019): Residual left facial droop/left extremity weakness. Continue ASA/statin. Resume plavix. 11. DVT/PE: twenty years ago, not on [address]. Continue home aspirin. Resume Plavix as above. 12. Depression/Anxiety/Fibromyalgia: Continue home Doxepin, Gabapentin, Latuda, Buspar, Lexapro. 13. HLD: Continue statin. 14. Essential HTN: Continue ACEI, BB.    15. Obesity: Body mass index is 39.92 kg/m². 16. Tobacco Abuse: Reports smoking 5 cigarettes per day, working on actively quitting. Initial H and P and Hospital course:-  Warnell Oppenheim is a 48year old white male smoker with PMH of CAD s/p 1stent (2008), CVA (03/2019) with residual left facial droop and left sided weakness, GERD, NIDDM type 2, Fibromyalgia, DVT (20 years ago) not on anticoagulation, HFpEF, COPD without baseline supplmental O2 use who presented to [facility] as direct transfer from [facility] complaining of 10/10 \"really bad heart burn\" that feels like burning, aching and occasionally sharp that started around 15:00 on today after eating and then lying down. It improved while in the ED after receiving morphine but worsened again after he at in the ED. It is not exertional, and did not improve with the SL nitro/nitro drip. Associated nausea and vomiting. Denies arm/shoulder/jaw pain, denies inc SOB from baseline, lightheadedness, hematochezia, hematemesis, melena, fever.     In the ED, vitals showed: HR 94, /77, RR 23, 93% on RA on arrival but was placed on 3L for unclear reason and afebrile. Labs showsed: WBC 13.9, hgb 12.4, MCV 78, plt 414, CMP unremarkable, ddimer mildly elevated at 0.52 (0-0.49 as reference range). Troponin neg. CXR showed no acute findings.   Patient was given SL nitro/nitro drip (which did not relieve the pain), zofran, heparin drip, and morphine. \"    As above, patient was admitted 10/2/20 for abdominal pain. Cardiac origin was ruled out with negative troponin, unremarkable EKG. GI was consulted for assistance. Negative GB US, EGD showing gastritis, HIDA scan showed chronic biliary dyskinesia. Patient tolerated procedures well. Diet was advanced, he was weaned off Dilaudid. Hx of chronic COPD and previous oxygen use, home O2 eval revealed he did not qualify. Patient to be d/c home with albuterol and Atrovent.  To f/u with GI as above. F/u with PCP in one week. Educated on diet. Stable for discharge on 10/5/20. No further questions or concerns. Physical Exam:-  Vitals:   Patient Vitals for the past 24 hrs:   BP Temp Temp src Pulse Resp SpO2   10/05/20 0815 (!) 148/63 98.1 °F (36.7 °C) Oral 68 18 96 %   10/05/20 0800 -- -- -- -- -- 98 %   10/05/20 0330 131/66 97.6 °F (36.4 °C) Oral 71 20 91 %   10/04/20 2015 132/84 97.8 °F (36.6 °C) Oral 70 18 96 %   10/04/20 1714 -- -- -- -- -- 98 %   10/04/20 1638 121/76 97.7 °F (36.5 °C) Oral 65 18 97 %     Weight:   Weight: 234 lb 12.8 oz (106.5 kg)   24 hour intake/output:     Intake/Output Summary (Last 24 hours) at 10/5/2020 1321  Last data filed at 10/5/2020 1023  Gross per 24 hour   Intake 150 ml   Output 1450 ml   Net -1300 ml       1. General appearance: No apparent distress, appears stated age and cooperative. 2. HEENT: Normal cephalic, atraumatic without obvious deformity. Pupils equal, round, and reactive to light. Extra ocular muscles intact. Conjunctivae/corneas clear. 3. Neck: Supple, with full range of motion. No jugular venous distention. Trachea midline. 4. Respiratory:  Normal respiratory effort. Clear to auscultation, bilaterally without Rales/Wheezes/Rhonchi. 5. Cardiovascular: Regular rate and rhythm with normal S1/S2 without murmurs, rubs or gallops. 6. Abdomen: Soft, non-tender, non-distended with normal bowel sounds. 7. Musculoskeletal:  No clubbing, cyanosis or edema bilaterally. 8. Skin: Skin color, texture, turgor normal.  No rashes or lesions. 9. Neurologic:  Neurovascularly intact without any focal sensory/motor deficits. Cranial nerves: II-XII intact, grossly non-focal.  10. Psychiatric: Alert and oriented, thought content appropriate, normal insight  11. Capillary Refill: Brisk,< 3 seconds   12.  Peripheral Pulses: +2 palpable, equal bilaterally       Discharge Medications:-      Medication List      START taking these medications    ipratropium 17 MCG/ACT inhaler  Commonly known as:   Atrovent HFA  Inhale 2 puffs into the lungs every 6 hours     sucralfate 1 GM tablet  Commonly known as:  CARAFATE  Take 1 tablet by mouth 4 times daily        CONTINUE taking these medications    aspirin 325 MG tablet     atorvastatin 40 MG tablet  Commonly known as:  LIPITOR     busPIRone 15 MG tablet  Commonly known as:  BUSPAR     doxepin 100 MG capsule  Commonly known as:  SINEQUAN     Flovent  MCG/ACT inhaler  Generic drug:  fluticasone     furosemide 20 MG tablet  Commonly known as:  LASIX     gabapentin 300 MG capsule  Commonly known as:  NEURONTIN     Latuda 20 MG Tabs tablet  Generic drug:  lurasidone     Lexapro 20 MG tablet  Generic drug:  escitalopram     lisinopril 10 MG tablet  Commonly known as:  PRINIVIL;ZESTRIL     metFORMIN 500 MG tablet  Commonly known as:  GLUCOPHAGE     metoprolol tartrate 25 MG tablet  Commonly known as:  LOPRESSOR     nitroGLYCERIN 0.4 MG SL tablet  Commonly known as:  NITROSTAT     NONFORMULARY     omeprazole 20 MG delayed release capsule  Commonly known as:  PRILOSEC     Plavix 75 MG tablet  Generic drug:  clopidogrel     potassium chloride 10 MEQ extended release tablet  Commonly known as:  KLOR-CON M     ProAir  (90 Base) MCG/ACT inhaler  Generic drug:  albuterol sulfate HFA     tiZANidine 4 MG tablet  Commonly known as:  Cristel Sanchez        STOP taking these medications    buPROPion 150 MG extended release tablet  Commonly known as:  WELLBUTRIN SR     divalproex 250 MG extended release tablet  Commonly known as:  DEPAKOTE ER           Where to Get Your Medications      These medications were sent to [facility]., [facility], [address] [place] [address]    Phone:  [phone]   · ipratropium 17 MCG/ACT inhaler  · sucralfate 1 GM tablet           Labs :-  Recent Results (from the past 72 hour(s))   EKG 12 Lead    Collection Time: 10/02/20  7:14 PM   Result Value Ref Range    Ventricular Rate 91 BPM    Atrial Rate 91 BPM    P-R Interval 138 ms    QRS Duration 82 ms    Q-T Interval 354 ms    QTc Calculation (Bazett) 435 ms    P Axis 44 degrees    R Axis 17 degrees    T Axis 14 degrees   Troponin    Collection Time: 10/02/20  8:05 PM   Result Value Ref Range    Troponin T < 0.010 ng/ml   Brain Natriuretic Peptide    Collection Time: 10/02/20  8:05 PM   Result Value Ref Range    Pro-.1 0.0 - 900.0 pg/mL   Comprehensive metabolic panel    Collection Time: 10/02/20  8:05 PM   Result Value Ref Range    Glucose 129 (H) 70 - 108 mg/dL    CREATININE 0.8 0.4 - 1.2 mg/dL    BUN 10 7 - 22 mg/dL    Sodium 139 135 - 145 meq/L    Potassium 4.3 3.5 - 5.2 meq/L    Chloride 103 98 - 111 meq/L    CO2 24 23 - 33 meq/L    Calcium 9.1 8.5 - 10.5 mg/dL    AST 38 5 - 40 U/L    Alkaline Phosphatase 96 38 - 126 U/L    Total Protein 7.3 6.1 - 8.0 g/dL    Alb 3.8 3.5 - 5.1 g/dL    Total Bilirubin 0.3 0.3 - 1.2 mg/dL    ALT 34 11 - 66 U/L   Magnesium    Collection Time: 10/02/20  8:05 PM   Result Value Ref Range    Magnesium 1.9 1.6 - 2.4 mg/dL   CBC auto differential    Collection Time: 10/02/20  8:05 PM   Result Value Ref Range    WBC 15.4 (H) 4.8 - 10.8 thou/mm3    RBC 4.50 (L) 4.70 - 6.10 mill/mm3    Hemoglobin 11.6 (L) 14.0 - 18.0 gm/dl    Hematocrit 36.3 (L) 42.0 - 52.0 %    MCV 80.7 80.0 - 94.0 fL    MCH 25.8 (L) 26.0 - 33.0 pg    MCHC 32.0 (L) 32.2 - 35.5 gm/dl    RDW-CV 16.1 (H) 11.5 - 14.5 %    RDW-SD 46.9 (H) 35.0 - 45.0 fL    Platelets 516 702 - 500 thou/mm3    MPV 8.5 (L) 9.4 - 12.4 fL    Seg Neutrophils 74.1 %    Lymphocytes 19.5 %    Monocytes 5.5 %    Eosinophils 0.1 %    Basophils 0.4 %    Immature Granulocytes 0.4 %    Segs Absolute 11.4 (H) 1.8 - 7.7 thou/mm3    Lymphocytes Absolute 3.0 1.0 - 4.8 thou/mm3    Monocytes Absolute 0.8 0.4 - 1.3 thou/mm3    Eosinophils Absolute 0.0 0.0 - 0.4 thou/mm3    Basophils Absolute 0.1 0.0 - 0.1 thou/mm3    Immature Grans (Abs) 0.06 0.00 - 0.07 thou/mm3    nRBC 0 /100 wbc   APTT    Collection Time: 10/02/20  8:05 PM   Result Value Ref Range    aPTT 24.6 22.0 - 38.0 seconds   Protime-INR    Collection Time: 10/02/20  8:05 PM   Result Value Ref Range    INR 1.08 0.85 - 1.13   Anion Gap    Collection Time: 10/02/20  8:05 PM   Result Value Ref Range    Anion Gap 12.0 8.0 - 16.0 meq/L   Glomerular Filtration Rate, Estimated    Collection Time: 10/02/20  8:05 PM   Result Value Ref Range    Est, Glom Filt Rate >90 ml/min/1.73m2   Troponin    Collection Time: 10/02/20 10:59 PM   Result Value Ref Range    Troponin T < 0.010 ng/ml   EKG 12 Lead    Collection Time: 10/02/20 11:54 PM   Result Value Ref Range    Ventricular Rate 81 BPM    Atrial Rate 81 BPM    P-R Interval 140 ms    QRS Duration 84 ms    Q-T Interval 376 ms    QTc Calculation (Bazett) 436 ms    P Axis 51 degrees    R Axis 30 degrees    T Axis 35 degrees   Basic Metabolic Panel w/ Reflex to MG    Collection Time: 10/03/20  5:07 AM   Result Value Ref Range    Sodium 137 135 - 145 meq/L    Potassium reflex Magnesium 4.1 3.5 - 5.2 meq/L    Chloride 101 98 - 111 meq/L    CO2 24 23 - 33 meq/L    Glucose 117 (H) 70 - 108 mg/dL    BUN 11 7 - 22 mg/dL    CREATININE 0.8 0.4 - 1.2 mg/dL    Calcium 8.7 8.5 - 10.5 mg/dL   CBC    Collection Time: 10/03/20  5:07 AM   Result Value Ref Range    WBC 11.7 (H) 4.8 - 10.8 thou/mm3    RBC 4.31 (L) 4.70 - 6.10 mill/mm3    Hemoglobin 11.2 (L) 14.0 - 18.0 gm/dl    Hematocrit 35.4 (L) 42.0 - 52.0 %    MCV 82.1 80.0 - 94.0 fL    MCH 26.0 26.0 - 33.0 pg    MCHC 31.6 (L) 32.2 - 35.5 gm/dl    RDW-CV 16.3 (H) 11.5 - 14.5 %    RDW-SD 48.5 (H) 35.0 - 45.0 fL    Platelets 402 318 - 992 thou/mm3    MPV 8.6 (L) 9.4 - 12.4 fL   Troponin    Collection Time: 10/03/20  5:07 AM   Result Value Ref Range    Troponin T < 0.010 ng/ml   Anion Gap    Collection Time: 10/03/20  5:07 AM   Result Value Ref Range    Anion Gap 12.0 8.0 - 16.0 meq/L   Glomerular Filtration Rate, Estimated Collection Time: 10/03/20  5:07 AM   Result Value Ref Range    Est, Glom Filt Rate >90 ml/min/1.73m2   Urine with Reflexed Micro    Collection Time: 10/03/20  1:51 PM   Result Value Ref Range    Glucose, Ur NEGATIVE NEGATIVE mg/dl    Bilirubin Urine NEGATIVE NEGATIVE    Ketones, Urine NEGATIVE NEGATIVE    Specific Gravity, Urine 1.030 1.002 - 1.030    Blood, Urine NEGATIVE NEGATIVE    pH, UA 5.0 5.0 - 9.0    Protein, UA NEGATIVE NEGATIVE    Urobilinogen, Urine 0.2 0.0 - 1.0 eu/dl    Nitrite, Urine NEGATIVE NEGATIVE    Leukocyte Esterase, Urine TRACE (A) NEGATIVE    Color, UA YELLOW STRAW-YELLOW    Character, Urine TURBID (A) CLEAR-SL CLOUD    RBC, UA 0-2 0-2/hpf /hpf    WBC, UA 10-15 0-4/hpf /hpf    Epithelial Cells, UA 3-5 3-5/hpf /hpf    Bacteria, UA NONE SEEN FEW/NONE SEEN /hpf    Casts UA 8-15 HYALINE NONE SEEN /lpf    Crystals, UA NONE SEEN NONE SEEN    Renal Epithelial, UA NONE SEEN NONE SEEN    Yeast, UA NONE SEEN NONE SEEN    CASTS 2 NONE SEEN NONE SEEN /lpf    MISCELLANEOUS 2 NONE SEEN    Culture, Reflexed, Urine    Collection Time: 10/03/20  1:51 PM    Specimen: Urine voided   Result Value Ref Range    Urine Culture Reflex (A)      No growth-preliminary Mixed growth. The mixture of organisms present represents both organisms that may cause urinary tract infections and organisms that are not a common cause of urinary tract infections and are possibly skin fred or distal urethral fred.      Organism Mixed Growth      (A)    CBC    Collection Time: 10/04/20  4:13 AM   Result Value Ref Range    WBC 7.6 4.8 - 10.8 thou/mm3    RBC 4.09 (L) 4.70 - 6.10 mill/mm3    Hemoglobin 10.8 (L) 14.0 - 18.0 gm/dl    Hematocrit 33.6 (L) 42.0 - 52.0 %    MCV 82.2 80.0 - 94.0 fL    MCH 26.4 26.0 - 33.0 pg    MCHC 32.1 (L) 32.2 - 35.5 gm/dl    RDW-CV 16.5 (H) 11.5 - 14.5 %    RDW-SD 48.8 (H) 35.0 - 45.0 fL    Platelets 689 970 - 438 thou/mm3    MPV 8.9 (L) 9.4 - 12.4 fL   Basic Metabolic Panel    Collection Time: 10/04/20 4:13 AM   Result Value Ref Range    Sodium 132 (L) 135 - 145 meq/L    Potassium 4.0 3.5 - 5.2 meq/L    Chloride 98 98 - 111 meq/L    CO2 23 23 - 33 meq/L    Glucose 119 (H) 70 - 108 mg/dL    BUN 11 7 - 22 mg/dL    CREATININE 0.8 0.4 - 1.2 mg/dL    Calcium 8.2 (L) 8.5 - 10.5 mg/dL   Anion Gap    Collection Time: 10/04/20  4:13 AM   Result Value Ref Range    Anion Gap 11.0 8.0 - 16.0 meq/L   Glomerular Filtration Rate, Estimated    Collection Time: 10/04/20  4:13 AM   Result Value Ref Range    Est, Glom Filt Rate >90 ml/min/1.73m2   CBC    Collection Time: 10/05/20  4:12 AM   Result Value Ref Range    WBC 9.5 4.8 - 10.8 thou/mm3    RBC 4.30 (L) 4.70 - 6.10 mill/mm3    Hemoglobin 11.3 (L) 14.0 - 18.0 gm/dl    Hematocrit 35.2 (L) 42.0 - 52.0 %    MCV 81.9 80.0 - 94.0 fL    MCH 26.3 26.0 - 33.0 pg    MCHC 32.1 (L) 32.2 - 35.5 gm/dl    RDW-CV 16.5 (H) 11.5 - 14.5 %    RDW-SD 48.4 (H) 35.0 - 45.0 fL    Platelets 178 790 - 900 thou/mm3    MPV 8.7 (L) 9.4 - 12.4 fL   Basic Metabolic Panel    Collection Time: 10/05/20  4:12 AM   Result Value Ref Range    Sodium 136 135 - 145 meq/L    Potassium 3.9 3.5 - 5.2 meq/L    Chloride 100 98 - 111 meq/L    CO2 25 23 - 33 meq/L    Glucose 100 70 - 108 mg/dL    BUN 7 7 - 22 mg/dL    CREATININE 0.9 0.4 - 1.2 mg/dL    Calcium 8.6 8.5 - 10.5 mg/dL   Anion Gap    Collection Time: 10/05/20  4:12 AM   Result Value Ref Range    Anion Gap 11.0 8.0 - 16.0 meq/L   Glomerular Filtration Rate, Estimated    Collection Time: 10/05/20  4:12 AM   Result Value Ref Range    Est, Glom Filt Rate 88 (A) ml/min/1.73m2        Microbiology:    Blood culture #1: No results found for: BC    Blood culture #2:No results found for: BLOODCULT2    Organism:  No results found for: LABGRAM    MRSA culture only:No results found for: [address] [place] [address]    Urine culture: No results found for: LABURIN  Lab Results   Component Value Date    ORG Mixed Growth      10/03/2020        Respiratory culture: No results found for: CULTRESP    Aerobic and Anaerobic :  No results found for: LABAERO  No results found for: LABANAE    Urinalysis:      Lab Results   Component Value Date    NITRU NEGATIVE 10/03/2020    WBCUA 10-15 10/03/2020    BACTERIA NONE SEEN 10/03/2020    RBCUA 0-2 10/03/2020    BLOODU NEGATIVE 10/03/2020    GLUCOSEU NEGATIVE 10/03/2020       Radiology:-  Us Gallbladder Ruq    Result Date: 10/3/2020  PROCEDURE: US GALLBLADDER RUQ CLINICAL INFORMATION: RUQ pain TECHNIQUE: Multiple sonographic images of the upper abdomen were obtained with specific attention given to the gallbladder. A few images of the liver, pancreas, and right kidney were also obtained. FINDINGS: Liver - L= 19.9 cm Gallbladder - 11.3 x 3.5 x 3.1 cm Gallbladder Wall - 0.27 cm Common Duct - 0.6 cm Ruelas's Sign: negative Liver, pancreas, right kidney: Pancreas obscured by overlying bowel gas. Visualized portions of right kidney unremarkable. Increased echogenicity of the liver, consistent with hepatic steatosis. Findings of focal fatty sparing adjacent to the gallbladder. Common bile duct: Normal in caliber. . Gallbladder : The gallbladder is normal in size and contour with no wall thickening or pericholecystic edema. . No gallstones are seen. Normal gallbladder ultrasound. **This report has been created using voice recognition software. It may contain minor errors which are inherent in voice recognition technology. ** Final report electronically signed by Dr. Summer Sexton on 10/3/2020 12:43 PM       Follow-up scheduled after discharge :-    in 1 weeks with SEUN Perez  in the next few weeks with CARIDAD Chun    Consultations during this hospital stay:-  [] NONE [] Cardiology  [] Nephrology  [] Hemo onco   [x] GI   [] ID  [] Endocrine  [] Pulm    [] Neuro    [] Psych   [] Urology  [] ENT   [] G SURGERY   []Ortho    []CV surg    [] Palliative  [] Hospice [] Pain management   []    []TCU   [] PT/OT  OTHERS:-    Disposition: home  Condition at Discharge: Stable    Time Spent:- 30 minutes    Electronically signed by Randell Luo PA-C on 10/5/2020 at 1:21 PM  Discharging Hospitalist

## 2020-10-26 ENCOUNTER — TELEPHONE (OUTPATIENT)
Dept: CARDIOLOGY CLINIC | Age: 55
End: 2020-10-26

## 2020-10-26 NOTE — TELEPHONE ENCOUNTER
Pre op Risk Assessment    Procedure EXTRACTION OF ALL LOWER TEETH  Physician [facility] DENTAL  Date of surgery/procedure TBD  Last OV 5-22-20  Last Stress 11-26-19  Last Echo 11-26-19  Last Cath NONE IN CHART    Is patient on blood thinners  MG PLAVIX  Hold Meds/how many days ? ?

## 2020-11-30 ENCOUNTER — TELEPHONE (OUTPATIENT)
Dept: CARDIOLOGY CLINIC | Age: 55
End: 2020-11-30

## 2020-11-30 NOTE — TELEPHONE ENCOUNTER
Pre op Risk Assessment    Procedure Lap Allegra  Physician Dr. Danii Art  Date of surgery/procedure 12/1    Last OV 5/22/2020 VV  Last Stress 11/26/19  Last Echo 12/26/19  Last Cath TBA  Last Stent TBA  Is patient on blood thinners asa, plavix   Hold Meds/how many days ? ?

## 2020-11-30 NOTE — TELEPHONE ENCOUNTER
Received a call from [address] [place] [address] requesting clearance for tomorrow. Called [phone] ext .  stating Dr. Wes Araujo orders below. Spoke to patient. He is unable to come to [facility] today. Patient doesn't drive. Appointment scheduled for tomorrow in [place] if PCP is unable to clear patient.

## 2020-12-01 ENCOUNTER — OFFICE VISIT (OUTPATIENT)
Dept: CARDIOLOGY CLINIC | Age: 55
End: 2020-12-01
Payer: MEDICARE

## 2020-12-01 VITALS — DIASTOLIC BLOOD PRESSURE: 78 MMHG | SYSTOLIC BLOOD PRESSURE: 121 MMHG | HEART RATE: 107 BPM

## 2020-12-01 PROCEDURE — G8484 FLU IMMUNIZE NO ADMIN: HCPCS | Performed by: NUCLEAR MEDICINE

## 2020-12-01 PROCEDURE — 3017F COLORECTAL CA SCREEN DOC REV: CPT | Performed by: NUCLEAR MEDICINE

## 2020-12-01 PROCEDURE — G8428 CUR MEDS NOT DOCUMENT: HCPCS | Performed by: NUCLEAR MEDICINE

## 2020-12-01 PROCEDURE — 4004F PT TOBACCO SCREEN RCVD TLK: CPT | Performed by: NUCLEAR MEDICINE

## 2020-12-01 PROCEDURE — 99214 OFFICE O/P EST MOD 30 MIN: CPT | Performed by: NUCLEAR MEDICINE

## 2020-12-01 PROCEDURE — G8417 CALC BMI ABV UP PARAM F/U: HCPCS | Performed by: NUCLEAR MEDICINE

## 2020-12-01 NOTE — PROGRESS NOTES
Social History     Tobacco Use    Smoking status: Current Every Day Smoker     Years: 31.00     Types: Cigarettes    Smokeless tobacco: Never Used    Tobacco comment: 5 cigarettes a day 3/14/16   Substance Use Topics    Alcohol use: No     Alcohol/week: 0.0 standard drinks      Current Outpatient Medications   Medication Sig Dispense Refill    doxepin (SINEQUAN) 100 MG capsule Take 100 mg by mouth nightly      busPIRone (BUSPAR) 15 MG tablet Take 15 mg by mouth 3 times daily      atorvastatin (LIPITOR) 40 MG tablet Take 40 mg by mouth daily      gabapentin (NEURONTIN) 300 MG capsule Take 800 mg by mouth 3 times daily.  aspirin 325 MG tablet Take 325 mg by mouth daily.  albuterol (PROAIR HFA) 108 (90 BASE) MCG/ACT inhaler Inhale 2 puffs into the lungs every 6 hours as needed.  sucralfate (CARAFATE) 1 GM tablet Take 1 tablet by mouth 4 times daily 120 tablet 3    ipratropium (ATROVENT HFA) 17 MCG/ACT inhaler Inhale 2 puffs into the lungs every 6 hours 1 Inhaler 3    potassium chloride (KLOR-CON M) 10 MEQ extended release tablet Take 10 mEq by mouth daily      omeprazole (PRILOSEC) 20 MG delayed release capsule Take 20 mg by mouth daily      NONFORMULARY Trelegy inhaler, once daily      metFORMIN (GLUCOPHAGE) 500 MG tablet Take 1,000 mg by mouth 2 times daily (with meals)       lisinopril (PRINIVIL;ZESTRIL) 10 MG tablet Take 10 mg by mouth daily      fluticasone (FLOVENT HFA) 110 MCG/ACT inhaler Inhale 1 puff into the lungs 2 times daily      furosemide (LASIX) 20 MG tablet Take 20 mg by mouth daily       lurasidone (LATUDA) 20 MG TABS tablet Take 80 mg by mouth Daily with supper       escitalopram (LEXAPRO) 20 MG tablet Take 20 mg by mouth daily      metoprolol (LOPRESSOR) 25 MG tablet Take 12.5 mg by mouth 2 times daily       tizanidine (ZANAFLEX) 4 MG tablet Take 4 mg by mouth every 6 hours as needed.         nitroGLYCERIN (NITROSTAT) 0.4 MG SL tablet Place 0.4 mg under CAD, previous stroke, severe limitation   Looks like needs the surgery urgently     Plan:  No follow-ups on file. High risk   Proceed   Patient understands well the risk   Increase metoprolol 25 bid   Continue risk factor modification and medical management  Thank you for allowing me to participate in the care of your patient. Please don't hesitate to contact me regarding any further issues related to the patient care    Orders Placed:  No orders of the defined types were placed in this encounter. Medications Prescribed:  No orders of the defined types were placed in this encounter. Discussed use, benefit, and side effects of prescribed medications. All patient questions answered. Pt voicedunderstanding. Instructed to continue current medications, diet and exercise. Continue risk factor modification and medical management. Patient agreed with treatment plan. Follow up as directed.     Electronically signedby Willow Parks MD on 12/1/2020 at 2:17 PM

## 2021-06-07 ENCOUNTER — TELEPHONE (OUTPATIENT)
Dept: CARDIOLOGY CLINIC | Age: 56
End: 2021-06-07

## 2024-02-20 PROBLEM — M46.1 SACROILIITIS: Status: ACTIVE | Noted: 2020-02-12

## 2024-02-20 PROBLEM — E78.5 HYPERLIPIDEMIA: Status: ACTIVE | Noted: 2018-06-25

## 2024-02-20 PROBLEM — Z95.5 STENTED CORONARY ARTERY: Status: ACTIVE | Noted: 2024-02-20

## 2024-02-20 PROBLEM — I63.9 ISCHEMIC STROKE (HCC): Status: ACTIVE | Noted: 2018-12-26

## 2024-02-20 PROBLEM — K21.9 GERD WITHOUT ESOPHAGITIS: Status: ACTIVE | Noted: 2024-02-20

## 2024-02-20 PROBLEM — K31.84 GASTROPARESIS: Status: ACTIVE | Noted: 2024-02-20

## 2024-02-20 PROBLEM — I69.354 HEMIPARESIS AFFECTING LEFT SIDE AS LATE EFFECT OF CEREBROVASCULAR ACCIDENT (HCC): Status: ACTIVE | Noted: 2019-04-11

## 2024-02-20 PROBLEM — D72.829 LEUKOCYTOSIS: Status: ACTIVE | Noted: 2019-01-02

## 2024-02-20 PROBLEM — K76.0 HEPATIC STEATOSIS: Status: ACTIVE | Noted: 2019-01-07

## 2024-02-20 PROBLEM — I25.2 HISTORY OF MI (MYOCARDIAL INFARCTION): Status: ACTIVE | Noted: 2019-03-27

## 2024-02-20 PROBLEM — R59.0 MEDIASTINAL LYMPHADENOPATHY: Status: ACTIVE | Noted: 2018-12-27

## 2024-02-20 PROBLEM — E11.9 TYPE 2 DIABETES MELLITUS (HCC): Status: ACTIVE | Noted: 2020-02-12

## 2024-02-20 PROBLEM — F17.210 CIGARETTE SMOKER: Status: ACTIVE | Noted: 2019-03-27

## 2024-02-20 PROBLEM — E55.9 VITAMIN D DEFICIENCY: Status: ACTIVE | Noted: 2020-02-12

## 2024-02-20 PROBLEM — R73.9 HYPERGLYCEMIA: Status: ACTIVE | Noted: 2019-03-27

## 2024-02-20 PROBLEM — G47.01 INSOMNIA SECONDARY TO CHRONIC PAIN: Status: ACTIVE | Noted: 2020-02-12

## 2024-02-20 PROBLEM — M19.90 OSTEOARTHROSIS: Status: ACTIVE | Noted: 2024-02-20

## 2024-02-20 PROBLEM — F33.9 CHRONIC MAJOR DEPRESSIVE DISORDER, RECURRENT EPISODE: Status: ACTIVE | Noted: 2018-06-25

## 2024-02-20 PROBLEM — M79.10 MYALGIA: Status: ACTIVE | Noted: 2018-10-02

## 2024-02-20 PROBLEM — G25.81 RLS (RESTLESS LEGS SYNDROME): Status: ACTIVE | Noted: 2024-02-20

## 2024-02-20 PROBLEM — E11.42 DIABETIC PERIPHERAL NEUROPATHY ASSOCIATED WITH TYPE 2 DIABETES MELLITUS (HCC): Status: ACTIVE | Noted: 2018-06-25

## 2024-02-20 PROBLEM — J45.40 MODERATE PERSISTENT ASTHMA: Status: ACTIVE | Noted: 2018-06-25

## 2024-02-20 PROBLEM — M51.369 DEGENERATION OF INTERVERTEBRAL DISC OF LUMBAR REGION: Status: ACTIVE | Noted: 2018-06-25

## 2024-02-20 PROBLEM — G47.33 OBSTRUCTIVE SLEEP APNEA SYNDROME: Status: ACTIVE | Noted: 2018-06-25

## 2024-02-20 PROBLEM — G89.29 INSOMNIA SECONDARY TO CHRONIC PAIN: Status: ACTIVE | Noted: 2020-02-12

## 2024-02-20 PROBLEM — F11.11 HISTORY OF HEROIN ABUSE (HCC): Status: ACTIVE | Noted: 2018-06-25

## 2024-02-20 PROBLEM — M54.50 LOW BACK PAIN: Status: ACTIVE | Noted: 2018-11-07

## 2024-02-20 PROBLEM — R91.1 NODULE OF RIGHT LUNG: Status: ACTIVE | Noted: 2019-01-07

## 2024-02-20 PROBLEM — D51.0 PERNICIOUS ANEMIA: Status: ACTIVE | Noted: 2024-02-20

## 2024-02-20 PROBLEM — I20.89 CHRONIC STABLE ANGINA: Status: ACTIVE | Noted: 2018-06-25

## 2025-02-19 ENCOUNTER — HOSPITAL ENCOUNTER (INPATIENT)
Age: 60
LOS: 2 days | Discharge: HOME | End: 2025-02-21
Attending: EMERGENCY MEDICINE
Payer: MEDICARE

## 2025-02-19 ENCOUNTER — APPOINTMENT (OUTPATIENT)
Dept: GENERAL RADIOLOGY | Age: 60
End: 2025-02-19
Payer: MEDICARE

## 2025-02-19 ENCOUNTER — APPOINTMENT (OUTPATIENT)
Dept: CT IMAGING | Age: 60
End: 2025-02-19
Payer: MEDICARE

## 2025-02-19 DIAGNOSIS — R53.1 LEFT-SIDED WEAKNESS: Primary | ICD-10-CM

## 2025-02-19 DIAGNOSIS — R29.810 FACIAL DROOP: ICD-10-CM

## 2025-02-19 PROBLEM — R29.90 STROKE-LIKE SYMPTOMS: Status: ACTIVE | Noted: 2025-02-19

## 2025-02-19 LAB
ALBUMIN SERPL BCG-MCNC: 3.7 G/DL (ref 3.5–5.1)
ALP SERPL-CCNC: 96 U/L (ref 38–126)
ALT SERPL W/O P-5'-P-CCNC: 8 U/L (ref 11–66)
ANION GAP SERPL CALC-SCNC: 11 MEQ/L (ref 8–16)
APTT PPP: 31.8 SECONDS (ref 22–38)
AST SERPL-CCNC: 11 U/L (ref 5–40)
BASOPHILS ABSOLUTE: 0.1 THOU/MM3 (ref 0–0.1)
BASOPHILS NFR BLD AUTO: 1 %
BILIRUB SERPL-MCNC: 0.3 MG/DL (ref 0.3–1.2)
BILIRUB UR QL STRIP.AUTO: NEGATIVE
BUN SERPL-MCNC: 19 MG/DL (ref 7–22)
CALCIUM SERPL-MCNC: 8.4 MG/DL (ref 8.2–9.6)
CHARACTER UR: CLEAR
CHLORIDE SERPL-SCNC: 103 MEQ/L (ref 98–111)
CO2 SERPL-SCNC: 23 MEQ/L (ref 23–33)
COLOR, UA: YELLOW
CREAT SERPL-MCNC: 0.9 MG/DL (ref 0.4–1.2)
DEPRECATED RDW RBC AUTO: 43.1 FL (ref 35–45)
EOSINOPHIL NFR BLD AUTO: 4 %
EOSINOPHILS ABSOLUTE: 0.3 THOU/MM3 (ref 0–0.4)
ERYTHROCYTE [DISTWIDTH] IN BLOOD BY AUTOMATED COUNT: 14.7 % (ref 11.5–14.5)
GFR SERPL CREATININE-BSD FRML MDRD: > 90 ML/MIN/1.73M2
GLUCOSE SERPL-MCNC: 93 MG/DL (ref 70–108)
GLUCOSE UR QL STRIP.AUTO: NEGATIVE MG/DL
HCT VFR BLD AUTO: 44.5 % (ref 42–52)
HGB BLD-MCNC: 14.4 GM/DL (ref 14–18)
HGB UR QL STRIP.AUTO: NEGATIVE
IMM GRANULOCYTES # BLD AUTO: 0.03 THOU/MM3 (ref 0–0.07)
IMM GRANULOCYTES NFR BLD AUTO: 0.4 %
INR PPP: 1.08 (ref 0.85–1.13)
KETONES UR QL STRIP.AUTO: NEGATIVE
LYMPHOCYTES ABSOLUTE: 3.3 THOU/MM3 (ref 1–4.8)
LYMPHOCYTES NFR BLD AUTO: 41.2 %
MCH RBC QN AUTO: 26.4 PG (ref 26–33)
MCHC RBC AUTO-ENTMCNC: 32.4 GM/DL (ref 32.2–35.5)
MCV RBC AUTO: 81.7 FL (ref 80–94)
MONOCYTES ABSOLUTE: 0.8 THOU/MM3 (ref 0.4–1.3)
MONOCYTES NFR BLD AUTO: 10.4 %
NEUTROPHILS ABSOLUTE: 3.4 THOU/MM3 (ref 1.8–7.7)
NEUTROPHILS NFR BLD AUTO: 43 %
NITRITE UR QL STRIP: NEGATIVE
NRBC BLD AUTO-RTO: 0 /100 WBC
OSMOLALITY SERPL CALC.SUM OF ELEC: 275.8 MOSMOL/KG (ref 275–300)
PH UR STRIP.AUTO: 5.5 [PH] (ref 5–9)
PLATELET # BLD AUTO: 316 THOU/MM3 (ref 130–400)
PMV BLD AUTO: 8.7 FL (ref 9.4–12.4)
POTASSIUM SERPL-SCNC: 4.5 MEQ/L (ref 3.5–5.2)
PROT SERPL-MCNC: 7.3 G/DL (ref 6.1–8)
PROT UR STRIP.AUTO-MCNC: NEGATIVE MG/DL
RBC # BLD AUTO: 5.45 MILL/MM3 (ref 4.7–6.1)
SODIUM SERPL-SCNC: 137 MEQ/L (ref 135–145)
SP GR UR REFRACT.AUTO: 1.02 (ref 1–1.03)
TROPONIN, HIGH SENSITIVITY: 14 NG/L (ref 0–12)
TROPONIN, HIGH SENSITIVITY: 14 NG/L (ref 0–12)
UROBILINOGEN, URINE: 0.2 EU/DL (ref 0–1)
WBC # BLD AUTO: 8 THOU/MM3 (ref 4.8–10.8)
WBC #/AREA URNS HPF: NEGATIVE /[HPF]

## 2025-02-19 PROCEDURE — 36415 COLL VENOUS BLD VENIPUNCTURE: CPT

## 2025-02-19 PROCEDURE — 85025 COMPLETE CBC W/AUTO DIFF WBC: CPT

## 2025-02-19 PROCEDURE — 96374 THER/PROPH/DIAG INJ IV PUSH: CPT

## 2025-02-19 PROCEDURE — 6360000002 HC RX W HCPCS: Performed by: EMERGENCY MEDICINE

## 2025-02-19 PROCEDURE — 99285 EMERGENCY DEPT VISIT HI MDM: CPT

## 2025-02-19 PROCEDURE — 71045 X-RAY EXAM CHEST 1 VIEW: CPT

## 2025-02-19 PROCEDURE — 70450 CT HEAD/BRAIN W/O DYE: CPT

## 2025-02-19 PROCEDURE — 81003 URINALYSIS AUTO W/O SCOPE: CPT

## 2025-02-19 PROCEDURE — 70498 CT ANGIOGRAPHY NECK: CPT

## 2025-02-19 PROCEDURE — 84484 ASSAY OF TROPONIN QUANT: CPT

## 2025-02-19 PROCEDURE — 85610 PROTHROMBIN TIME: CPT

## 2025-02-19 PROCEDURE — 70496 CT ANGIOGRAPHY HEAD: CPT

## 2025-02-19 PROCEDURE — 93005 ELECTROCARDIOGRAM TRACING: CPT | Performed by: EMERGENCY MEDICINE

## 2025-02-19 PROCEDURE — 99223 1ST HOSP IP/OBS HIGH 75: CPT | Performed by: PHYSICIAN ASSISTANT

## 2025-02-19 PROCEDURE — 85730 THROMBOPLASTIN TIME PARTIAL: CPT

## 2025-02-19 PROCEDURE — 2580000003 HC RX 258: Performed by: EMERGENCY MEDICINE

## 2025-02-19 PROCEDURE — 80053 COMPREHEN METABOLIC PANEL: CPT

## 2025-02-19 PROCEDURE — 96375 TX/PRO/DX INJ NEW DRUG ADDON: CPT

## 2025-02-19 PROCEDURE — 1200000003 HC TELEMETRY R&B

## 2025-02-19 PROCEDURE — 6360000004 HC RX CONTRAST MEDICATION: Performed by: EMERGENCY MEDICINE

## 2025-02-19 PROCEDURE — 2500000003 HC RX 250 WO HCPCS: Performed by: PHYSICIAN ASSISTANT

## 2025-02-19 RX ORDER — SODIUM CHLORIDE 0.9 % (FLUSH) 0.9 %
10 SYRINGE (ML) INJECTION EVERY 12 HOURS SCHEDULED
Status: DISCONTINUED | OUTPATIENT
Start: 2025-02-19 | End: 2025-02-21 | Stop reason: HOSPADM

## 2025-02-19 RX ORDER — POTASSIUM CHLORIDE 1500 MG/1
40 TABLET, EXTENDED RELEASE ORAL PRN
Status: DISCONTINUED | OUTPATIENT
Start: 2025-02-19 | End: 2025-02-21 | Stop reason: HOSPADM

## 2025-02-19 RX ORDER — ACETAMINOPHEN 650 MG/1
650 SUPPOSITORY RECTAL EVERY 6 HOURS PRN
Status: DISCONTINUED | OUTPATIENT
Start: 2025-02-19 | End: 2025-02-21 | Stop reason: HOSPADM

## 2025-02-19 RX ORDER — KETOROLAC TROMETHAMINE 30 MG/ML
15 INJECTION, SOLUTION INTRAMUSCULAR; INTRAVENOUS ONCE
Status: COMPLETED | OUTPATIENT
Start: 2025-02-19 | End: 2025-02-19

## 2025-02-19 RX ORDER — PROCHLORPERAZINE EDISYLATE 5 MG/ML
10 INJECTION INTRAMUSCULAR; INTRAVENOUS ONCE
Status: COMPLETED | OUTPATIENT
Start: 2025-02-19 | End: 2025-02-19

## 2025-02-19 RX ORDER — ONDANSETRON 4 MG/1
4 TABLET, ORALLY DISINTEGRATING ORAL EVERY 8 HOURS PRN
Status: DISCONTINUED | OUTPATIENT
Start: 2025-02-19 | End: 2025-02-21 | Stop reason: HOSPADM

## 2025-02-19 RX ORDER — POTASSIUM CHLORIDE 7.45 MG/ML
10 INJECTION INTRAVENOUS PRN
Status: DISCONTINUED | OUTPATIENT
Start: 2025-02-19 | End: 2025-02-21 | Stop reason: HOSPADM

## 2025-02-19 RX ORDER — POLYETHYLENE GLYCOL 3350 17 G/17G
17 POWDER, FOR SOLUTION ORAL DAILY PRN
Status: DISCONTINUED | OUTPATIENT
Start: 2025-02-19 | End: 2025-02-21 | Stop reason: HOSPADM

## 2025-02-19 RX ORDER — ENOXAPARIN SODIUM 100 MG/ML
40 INJECTION SUBCUTANEOUS DAILY
Status: DISCONTINUED | OUTPATIENT
Start: 2025-02-20 | End: 2025-02-21 | Stop reason: HOSPADM

## 2025-02-19 RX ORDER — SODIUM CHLORIDE 9 MG/ML
INJECTION, SOLUTION INTRAVENOUS PRN
Status: DISCONTINUED | OUTPATIENT
Start: 2025-02-19 | End: 2025-02-21 | Stop reason: HOSPADM

## 2025-02-19 RX ORDER — ACETAMINOPHEN 325 MG/1
650 TABLET ORAL EVERY 6 HOURS PRN
Status: DISCONTINUED | OUTPATIENT
Start: 2025-02-19 | End: 2025-02-21 | Stop reason: HOSPADM

## 2025-02-19 RX ORDER — ONDANSETRON 2 MG/ML
4 INJECTION INTRAMUSCULAR; INTRAVENOUS EVERY 6 HOURS PRN
Status: DISCONTINUED | OUTPATIENT
Start: 2025-02-19 | End: 2025-02-21 | Stop reason: HOSPADM

## 2025-02-19 RX ORDER — 0.9 % SODIUM CHLORIDE 0.9 %
1000 INTRAVENOUS SOLUTION INTRAVENOUS ONCE
Status: COMPLETED | OUTPATIENT
Start: 2025-02-19 | End: 2025-02-19

## 2025-02-19 RX ORDER — IOPAMIDOL 755 MG/ML
80 INJECTION, SOLUTION INTRAVASCULAR
Status: COMPLETED | OUTPATIENT
Start: 2025-02-19 | End: 2025-02-19

## 2025-02-19 RX ORDER — SODIUM CHLORIDE 0.9 % (FLUSH) 0.9 %
10 SYRINGE (ML) INJECTION PRN
Status: DISCONTINUED | OUTPATIENT
Start: 2025-02-19 | End: 2025-02-21 | Stop reason: HOSPADM

## 2025-02-19 RX ORDER — MAGNESIUM SULFATE IN WATER 40 MG/ML
2000 INJECTION, SOLUTION INTRAVENOUS PRN
Status: DISCONTINUED | OUTPATIENT
Start: 2025-02-19 | End: 2025-02-21 | Stop reason: HOSPADM

## 2025-02-19 RX ADMIN — SODIUM CHLORIDE, PRESERVATIVE FREE 10 ML: 5 INJECTION INTRAVENOUS at 23:56

## 2025-02-19 RX ADMIN — PROCHLORPERAZINE EDISYLATE 10 MG: 5 INJECTION INTRAMUSCULAR; INTRAVENOUS at 21:50

## 2025-02-19 RX ADMIN — KETOROLAC TROMETHAMINE 15 MG: 30 INJECTION, SOLUTION INTRAMUSCULAR at 21:49

## 2025-02-19 RX ADMIN — SODIUM CHLORIDE 1000 ML: 9 INJECTION, SOLUTION INTRAVENOUS at 21:33

## 2025-02-19 RX ADMIN — IOPAMIDOL 80 ML: 755 INJECTION, SOLUTION INTRAVENOUS at 18:59

## 2025-02-19 ASSESSMENT — PAIN DESCRIPTION - ORIENTATION
ORIENTATION: LEFT

## 2025-02-19 ASSESSMENT — PAIN SCALES - GENERAL
PAINLEVEL_OUTOF10: 9
PAINLEVEL_OUTOF10: 10
PAINLEVEL_OUTOF10: 10
PAINLEVEL_OUTOF10: 9

## 2025-02-19 ASSESSMENT — PAIN DESCRIPTION - DESCRIPTORS: DESCRIPTORS: ACHING;HEAVINESS

## 2025-02-19 ASSESSMENT — PAIN - FUNCTIONAL ASSESSMENT
PAIN_FUNCTIONAL_ASSESSMENT: 0-10
PAIN_FUNCTIONAL_ASSESSMENT: NONE - DENIES PAIN
PAIN_FUNCTIONAL_ASSESSMENT: 0-10
PAIN_FUNCTIONAL_ASSESSMENT: 0-10

## 2025-02-19 ASSESSMENT — PAIN DESCRIPTION - PAIN TYPE: TYPE: ACUTE PAIN

## 2025-02-19 ASSESSMENT — PAIN DESCRIPTION - LOCATION: LOCATION: GENERALIZED
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
## 2025-02-19 NOTE — ED TRIAGE NOTES
Pt presents to the ED with c/o left-sided facial droop and left sided weakness that started around 0830 on 02/18/25. Pt states he has had three prior strokes and that he is concerned that he has had another one.

## 2025-02-19 NOTE — ED NOTES
Pt reports each abnormal finding in the NIH is chronic to him since he had his last stroke. No new findings reported in NIH score, per the pt

## 2025-02-20 ENCOUNTER — APPOINTMENT (OUTPATIENT)
Dept: MRI IMAGING | Age: 60
End: 2025-02-20
Payer: MEDICARE

## 2025-02-20 PROBLEM — J44.89 COPD WITH ASTHMA (HCC): Status: ACTIVE | Noted: 2025-02-20

## 2025-02-20 PROBLEM — Z86.718 HISTORY OF DVT (DEEP VEIN THROMBOSIS): Status: ACTIVE | Noted: 2025-02-20

## 2025-02-20 PROBLEM — F17.210 CIGARETTE NICOTINE DEPENDENCE WITHOUT COMPLICATION: Status: ACTIVE | Noted: 2025-02-20

## 2025-02-20 PROBLEM — F41.9 ANXIETY AND DEPRESSION: Status: ACTIVE | Noted: 2025-02-20

## 2025-02-20 PROBLEM — F32.A ANXIETY AND DEPRESSION: Status: ACTIVE | Noted: 2025-02-20

## 2025-02-20 PROBLEM — Z86.73 HISTORY OF STROKE: Status: ACTIVE | Noted: 2025-02-20

## 2025-02-20 PROBLEM — I25.10 CORONARY ARTERY DISEASE INVOLVING NATIVE CORONARY ARTERY OF NATIVE HEART WITHOUT ANGINA PECTORIS: Status: ACTIVE | Noted: 2025-02-20

## 2025-02-20 LAB
AMPHETAMINES UR QL SCN: NEGATIVE
BARBITURATES UR QL SCN: NEGATIVE
BENZODIAZ UR QL SCN: NEGATIVE
BZE UR QL SCN: NEGATIVE
CANNABINOIDS UR QL SCN: NEGATIVE
CHOLEST SERPL-MCNC: 115 MG/DL (ref 100–199)
DEPRECATED MEAN GLUCOSE BLD GHB EST-ACNC: 129 MG/DL (ref 70–126)
EKG ATRIAL RATE: 71 BPM
EKG P AXIS: 53 DEGREES
EKG P-R INTERVAL: 134 MS
EKG Q-T INTERVAL: 388 MS
EKG QRS DURATION: 84 MS
EKG QTC CALCULATION (BAZETT): 421 MS
EKG R AXIS: 4 DEGREES
EKG T AXIS: 41 DEGREES
EKG VENTRICULAR RATE: 71 BPM
FENTANYL: NEGATIVE
GLUCOSE BLD STRIP.AUTO-MCNC: 103 MG/DL (ref 70–108)
GLUCOSE BLD STRIP.AUTO-MCNC: 110 MG/DL (ref 70–108)
GLUCOSE BLD STRIP.AUTO-MCNC: 117 MG/DL (ref 70–108)
GLUCOSE BLD STRIP.AUTO-MCNC: 123 MG/DL (ref 70–108)
HBA1C MFR BLD HPLC: 6.3 % (ref 4.4–6.4)
HDLC SERPL-MCNC: 31 MG/DL
LDLC SERPL CALC-MCNC: 56 MG/DL
OPIATES UR QL SCN: NEGATIVE
OXYCODONE: NEGATIVE
PCP UR QL SCN: NEGATIVE
TRIGL SERPL-MCNC: 141 MG/DL (ref 0–199)

## 2025-02-20 PROCEDURE — 82948 REAGENT STRIP/BLOOD GLUCOSE: CPT

## 2025-02-20 PROCEDURE — 93010 ELECTROCARDIOGRAM REPORT: CPT | Performed by: INTERNAL MEDICINE

## 2025-02-20 PROCEDURE — 97166 OT EVAL MOD COMPLEX 45 MIN: CPT

## 2025-02-20 PROCEDURE — 80061 LIPID PANEL: CPT

## 2025-02-20 PROCEDURE — 99223 1ST HOSP IP/OBS HIGH 75: CPT

## 2025-02-20 PROCEDURE — 97116 GAIT TRAINING THERAPY: CPT

## 2025-02-20 PROCEDURE — 6370000000 HC RX 637 (ALT 250 FOR IP): Performed by: NURSE PRACTITIONER

## 2025-02-20 PROCEDURE — 2500000003 HC RX 250 WO HCPCS: Performed by: PHYSICIAN ASSISTANT

## 2025-02-20 PROCEDURE — 70551 MRI BRAIN STEM W/O DYE: CPT

## 2025-02-20 PROCEDURE — 97162 PT EVAL MOD COMPLEX 30 MIN: CPT

## 2025-02-20 PROCEDURE — 99232 SBSQ HOSP IP/OBS MODERATE 35: CPT | Performed by: PHYSICIAN ASSISTANT

## 2025-02-20 PROCEDURE — 6360000002 HC RX W HCPCS: Performed by: PHYSICIAN ASSISTANT

## 2025-02-20 PROCEDURE — 83036 HEMOGLOBIN GLYCOSYLATED A1C: CPT

## 2025-02-20 PROCEDURE — 97535 SELF CARE MNGMENT TRAINING: CPT

## 2025-02-20 PROCEDURE — 36415 COLL VENOUS BLD VENIPUNCTURE: CPT

## 2025-02-20 PROCEDURE — 97530 THERAPEUTIC ACTIVITIES: CPT

## 2025-02-20 PROCEDURE — 1200000003 HC TELEMETRY R&B

## 2025-02-20 PROCEDURE — 6370000000 HC RX 637 (ALT 250 FOR IP): Performed by: PHYSICIAN ASSISTANT

## 2025-02-20 PROCEDURE — 92610 EVALUATE SWALLOWING FUNCTION: CPT

## 2025-02-20 PROCEDURE — 92523 SPEECH SOUND LANG COMPREHEN: CPT

## 2025-02-20 PROCEDURE — 80307 DRUG TEST PRSMV CHEM ANLYZR: CPT

## 2025-02-20 RX ORDER — LORAZEPAM 2 MG/ML
2 INJECTION INTRAMUSCULAR ONCE
Status: COMPLETED | OUTPATIENT
Start: 2025-02-20 | End: 2025-02-20

## 2025-02-20 RX ORDER — FLUTICASONE PROPIONATE 110 UG/1
1 AEROSOL, METERED RESPIRATORY (INHALATION) 2 TIMES DAILY
Status: DISCONTINUED | OUTPATIENT
Start: 2025-02-20 | End: 2025-02-20

## 2025-02-20 RX ORDER — NITROGLYCERIN 0.4 MG/1
0.4 TABLET SUBLINGUAL EVERY 5 MIN PRN
Status: DISCONTINUED | OUTPATIENT
Start: 2025-02-20 | End: 2025-02-21 | Stop reason: HOSPADM

## 2025-02-20 RX ORDER — ALBUTEROL SULFATE 90 UG/1
2 INHALANT RESPIRATORY (INHALATION) EVERY 6 HOURS PRN
Status: DISCONTINUED | OUTPATIENT
Start: 2025-02-20 | End: 2025-02-21 | Stop reason: HOSPADM

## 2025-02-20 RX ORDER — LIDOCAINE 4 G/G
1 PATCH TOPICAL EVERY 24 HOURS
Status: DISCONTINUED | OUTPATIENT
Start: 2025-02-20 | End: 2025-02-21 | Stop reason: HOSPADM

## 2025-02-20 RX ORDER — GLUCAGON 1 MG/ML
1 KIT INJECTION PRN
Status: DISCONTINUED | OUTPATIENT
Start: 2025-02-20 | End: 2025-02-21 | Stop reason: HOSPADM

## 2025-02-20 RX ORDER — DEXTROSE MONOHYDRATE 100 MG/ML
INJECTION, SOLUTION INTRAVENOUS CONTINUOUS PRN
Status: DISCONTINUED | OUTPATIENT
Start: 2025-02-20 | End: 2025-02-21 | Stop reason: HOSPADM

## 2025-02-20 RX ORDER — ESCITALOPRAM OXALATE 20 MG/1
20 TABLET ORAL DAILY
Status: DISCONTINUED | OUTPATIENT
Start: 2025-02-20 | End: 2025-02-21 | Stop reason: HOSPADM

## 2025-02-20 RX ORDER — POTASSIUM CHLORIDE 1500 MG/1
10 TABLET, EXTENDED RELEASE ORAL DAILY
Status: DISCONTINUED | OUTPATIENT
Start: 2025-02-20 | End: 2025-02-21 | Stop reason: HOSPADM

## 2025-02-20 RX ORDER — CLOPIDOGREL BISULFATE 75 MG/1
300 TABLET ORAL ONCE
Status: COMPLETED | OUTPATIENT
Start: 2025-02-20 | End: 2025-02-20

## 2025-02-20 RX ORDER — METOPROLOL TARTRATE 25 MG/1
25 TABLET, FILM COATED ORAL 2 TIMES DAILY
Status: DISCONTINUED | OUTPATIENT
Start: 2025-02-20 | End: 2025-02-21 | Stop reason: HOSPADM

## 2025-02-20 RX ORDER — ATORVASTATIN CALCIUM 40 MG/1
40 TABLET, FILM COATED ORAL DAILY
Status: DISCONTINUED | OUTPATIENT
Start: 2025-02-20 | End: 2025-02-21 | Stop reason: HOSPADM

## 2025-02-20 RX ORDER — FUROSEMIDE 20 MG/1
20 TABLET ORAL DAILY
Status: DISCONTINUED | OUTPATIENT
Start: 2025-02-20 | End: 2025-02-21 | Stop reason: HOSPADM

## 2025-02-20 RX ORDER — LISINOPRIL 10 MG/1
10 TABLET ORAL DAILY
Status: DISCONTINUED | OUTPATIENT
Start: 2025-02-20 | End: 2025-02-21 | Stop reason: HOSPADM

## 2025-02-20 RX ORDER — INSULIN LISPRO 100 [IU]/ML
0-4 INJECTION, SOLUTION INTRAVENOUS; SUBCUTANEOUS
Status: DISCONTINUED | OUTPATIENT
Start: 2025-02-20 | End: 2025-02-21 | Stop reason: HOSPADM

## 2025-02-20 RX ORDER — NICOTINE 21 MG/24HR
1 PATCH, TRANSDERMAL 24 HOURS TRANSDERMAL DAILY
Status: DISCONTINUED | OUTPATIENT
Start: 2025-02-20 | End: 2025-02-21 | Stop reason: HOSPADM

## 2025-02-20 RX ORDER — DOXEPIN HYDROCHLORIDE 50 MG/1
100 CAPSULE ORAL NIGHTLY
Status: DISCONTINUED | OUTPATIENT
Start: 2025-02-20 | End: 2025-02-21 | Stop reason: HOSPADM

## 2025-02-20 RX ADMIN — CLOPIDOGREL BISULFATE 300 MG: 75 TABLET ORAL at 05:04

## 2025-02-20 RX ADMIN — METOPROLOL TARTRATE 25 MG: 25 TABLET, FILM COATED ORAL at 19:33

## 2025-02-20 RX ADMIN — SODIUM CHLORIDE, PRESERVATIVE FREE 10 ML: 5 INJECTION INTRAVENOUS at 19:33

## 2025-02-20 RX ADMIN — LISINOPRIL 10 MG: 10 TABLET ORAL at 08:43

## 2025-02-20 RX ADMIN — FUROSEMIDE 20 MG: 20 TABLET ORAL at 08:43

## 2025-02-20 RX ADMIN — DOXEPIN HYDROCHLORIDE 100 MG: 50 CAPSULE ORAL at 19:33

## 2025-02-20 RX ADMIN — ENOXAPARIN SODIUM 40 MG: 100 INJECTION SUBCUTANEOUS at 08:43

## 2025-02-20 RX ADMIN — SODIUM CHLORIDE, PRESERVATIVE FREE 10 ML: 5 INJECTION INTRAVENOUS at 08:44

## 2025-02-20 RX ADMIN — LORAZEPAM 2 MG: 2 INJECTION INTRAMUSCULAR; INTRAVENOUS at 11:18

## 2025-02-20 RX ADMIN — METOPROLOL TARTRATE 25 MG: 25 TABLET, FILM COATED ORAL at 08:43

## 2025-02-20 RX ADMIN — ESCITALOPRAM OXALATE 20 MG: 20 TABLET ORAL at 08:43

## 2025-02-20 RX ADMIN — ACETAMINOPHEN 650 MG: 325 TABLET ORAL at 15:30

## 2025-02-20 RX ADMIN — POTASSIUM CHLORIDE 10 MEQ: 1500 TABLET, EXTENDED RELEASE ORAL at 08:43

## 2025-02-20 RX ADMIN — ATORVASTATIN CALCIUM 40 MG: 40 TABLET, FILM COATED ORAL at 08:43

## 2025-02-20 ASSESSMENT — ENCOUNTER SYMPTOMS
COUGH: 0
SORE THROAT: 0
VOMITING: 0
ABDOMINAL PAIN: 0
RHINORRHEA: 0
NAUSEA: 0
SHORTNESS OF BREATH: 0

## 2025-02-20 ASSESSMENT — PAIN DESCRIPTION - LOCATION: LOCATION: KNEE

## 2025-02-20 ASSESSMENT — PAIN SCALES - GENERAL: PAINLEVEL_OUTOF10: 5

## 2025-02-20 ASSESSMENT — PAIN DESCRIPTION - ORIENTATION: ORIENTATION: LEFT

## 2025-02-20 NOTE — ED NOTES
ED to inpatient nurses report    Chief Complaint   Patient presents with    Facial Droop    Extremity Weakness     left      Present to ED from home  LOC: alert and orientated to name, place, date  Vital signs   Vitals:    02/19/25 1843 02/19/25 1939 02/19/25 2038 02/19/25 2120   BP: (!) 103/53 (!) 89/45 107/67 (!) 105/57   Pulse: 74 72 91 80   Resp: 16 18 14 19   Temp:       TempSrc:       SpO2: 96% 96% 97% 95%   Weight:       Height:          Oxygen Baseline room air / O2 NC 2.5 L @ night for sleep apnea    Current needs required room air Bipap/Cpap No  LDAs:   Peripheral IV 02/19/25 Left Antecubital (Active)   Site Assessment Clean, dry & intact 02/19/25 2134   Line Status Flushed;Infusing 02/19/25 2134   Phlebitis Assessment No symptoms 02/19/25 1703   Infiltration Assessment 0 02/19/25 1703   Dressing Status New dressing applied;Clean, dry & intact 02/19/25 1703   Dressing Type Transparent 02/19/25 1703   Dressing Intervention New 02/19/25 1703     Mobility: Requires assistance * 1  Pending ED orders: complete  Present condition: stable      Electronically signed by Denisa Curry RN on 2/19/2025 at 10:05 PM

## 2025-02-20 NOTE — PROGRESS NOTES
Advanced Directives Consult: Pt wanted to think more about it, have a discussion with his family before it's completed. Prayer appreciated.    02/20/25 1427   Encounter Summary   Encounter Overview/Reason Advance Care Planning   Service Provided For Patient   Referral/Consult From Nurse   Support System Family members   Last Encounter  02/20/25   Complexity of Encounter Low   Begin Time 1131   End Time  1137   Total Time Calculated 6 min   Spiritual/Emotional needs   Type Spiritual Support   Advance Care Planning   Type ACP conversation   Assessment/Intervention/Outcome   Assessment Hopeful   Intervention Empowerment   Outcome Encouraged

## 2025-02-20 NOTE — ED NOTES
Spoke to Maris on 6K who approved patient transfer to 6K-18. Patient transported upstairs in stable condition.

## 2025-02-20 NOTE — PROGRESS NOTES
[facility]  INPATIENT PHYSICAL THERAPY  EVALUATION  STRZ RENAL TELEMETRY 6K - 6K-18/018-A    Discharge Recommendations: Home with Home health PT  Equipment Recommendations: No               Time In: 734  Time Out: 805  Timed Code Treatment Minutes: 23 Minutes  Minutes: 31          Date: 2025  Patient Name: Arun Collazo,  Gender:  male        MRN: [id number]  : 1965  (59 y.o.)      Referring Practitioner: Nadeem Miller PA  Diagnosis: Stroke-like symptoms  Additional Pertinent Hx: Per EMR:Arun Collazo is a 59 y.o. male with  who presents to [facility] with facial droop and extremity weakness. Patients last known well over 24 hours prior to presentation in the ED - noted around 8 am on . Patient does have a history of prior CVA with residual left sided weakness however has noted new left side facial droop not prior on from past CVA. On chart review it was noted he did have facial droop from prior CVA so unclear if this is actually a new finding or worsening of an old CVA effect. Patient was on DAPT previously but has been off of his plavix 2/ to provider access issues and getting it prescribed to him again. MRI pending, CT and CTA of head and neck negative for acute finding.     Restrictions/Precautions:  Restrictions/Precautions: General Precautions, Fall Risk       Other Position/Activity Restrictions: hx L side weakness from previous CVAs           Subjective:  Chart Reviewed: Yes  Patient assessed for rehabilitation services?: Yes  Family/Caregiver Present: No  Subjective: RN approved session. Pt agreed for evaluation, he states he is feeling back to his baseline. He denies concern with return home. Education provided on safety with 4ww brakes, to have assistance as needed and to ease into his mobility routine.    General:  Overall Orientation Status: Within Functional Limits  Vision: Impaired  Vision Exceptions: Wears glasses at all times  Hearing: Within functional

## 2025-02-20 NOTE — PROGRESS NOTES
Pt admitted to  6K18 in a wheelchair from ED.  Complaints: stroke-like symptoms, left side weakness and left facial droop.      IV site free of s/s of infection or infiltration with no IV fluid running. Vital signs obtained. Assessment and data collection initiated. Two nurse skin assessment performed by Betty OSBORNE and Tonja Rankin RN. Oriented to room. Policies and procedures for 6K explained. Betty OSBORNE discussed hourly rounding with patient addressing 5 P's. Fall prevention and safety brochure discussed with patient.  Bed alarm on. Call light in reach.  Explained patients right to have family, representative or physician notified of their admission.  Patient has Declined for physician to be notified.  Patient has Declined for family/representative to be notified. All questions answered with no further questions at this time.

## 2025-02-20 NOTE — CONSULTS
Neurology Consult Note    Date:2/20/2025       Room:[facility]18/018-A  Patient Name:Arun Collazo     YOB: 1965     Age:59 y.o.    Requesting Physician: Husam Lopez PA-C     Reason for Consult:  Evaluate for new neuro deficits with stroke history      Chief Complaint:   Chief Complaint   Patient presents with    Facial Droop    Extremity Weakness     left       Subjective     Arun Collazo is a 59 y.o. male with a history of CAD, CHF, COPD, asthma, previous stroke with residual left-sided deficits, depression, diabetes, fibromyalgia, hypertension, seizures, smoker, ROSALINO who presented to [facility] ED yesterday 2/19 for further evaluation of left upper extremity weakness, left lower extremity weakness, and left-sided facial droop.  Patient's last known well was at 8 AM on 2/18.  Given this a stroke alert was not activated.  Patient states that he takes an aspirin 325 mg daily and admits to compliance.  He states that he used to take Plavix as well for the last 8 months he has been out of it and has not gotten a new cardiology to prescribe it.  He states he is taking Plavix since his heart stent in 2008.  CT head was negative for any acute intracranial abnormality.  CTA head and neck obtained and negative for any hemodynamically significant stenosis.  MRI brain without contrast negative for acute infarct.  At time of evaluation patient admits to feeling well.  He states all the symptoms he currently has are residual from his previous stroke, but does note they seem to be worse than baseline.  He denies any headache, recent illness, vision changes, new weakness or numbness.    Review of Systems   Review of Systems   Constitutional:  Negative for chills and fever.   HENT:  Negative for rhinorrhea and sore throat.    Eyes:  Negative for visual disturbance.   Respiratory:  Negative for cough and shortness of breath.    Cardiovascular:  Negative for chest pain.   Gastrointestinal:  Negative for abdominal pain,    BUN 19   CREATININE 0.9   GLUCOSE 93     COAGULATION STUDIES:  Recent Labs     02/19/25  1844   INR 1.08   APTT 31.8     LIVER PROFILE:  Recent Labs     02/19/25  1711   AST 11   ALT 8*   BILITOT 0.3   ALKPHOS 96     CHOLESTEROL AND A1C:  Recent Labs     02/20/25  0556   HDL 31   CHOL 115   TRIG 141   LABA1C 6.3      Imaging Last 24 Hours:  CTA HEAD W WO CONTRAST    Result Date: 2/19/2025  Exam: CTA brain with contrast, CTA neck with contrast Procedure: Contrast was administered. MIP reconstructions were performed for the CTA Comparison: 02/19/2025, 12/05/2011 Clinical history: Left facial droop Findings: CTA neck: Aortic arch branching is conventional origin of the left vertebral artery off the arch. Calcification noted at the origin of the great vessels without high-grade stenosis or occlusion. CCA bilaterally do not demonstrate high grade stenosis or occlusion. ICA bilaterally demonstrate atherosclerotic disease with mild bilateral luminal narrowing. Vertebral arteries are patent throughout their cervical course, without dissection. Lung apices demonstrate moderate emphysema. Multilevel degenerative disease of the cervical spine.     Impression: 1. CTA of the neck demonstrates no occlusion or high-grade stenosis of the cervical carotid or vertebral arteries. No findings of dissection. CTA head: Intracranial ICAs bilaterally demonstrate calcifications without high-grade stenosis. No aneurysm. BERTHA bilaterally demonstrate no occlusion or high-grade stenosis. MCA bilaterally demonstrate no occlusion or high-grade stenosis. PCA bilaterally demonstrate no occlusion or high-grade stenosis. Bilateral posterior communicating arteries. Intracranial vertebral arteries are patent. Basilar artery is patent, without aneurysm or stenosis. Dural venous sinuses are patent. Impression: 1. CTA of the intracranial circulation demonstrates no large vessel occlusion or high-grade stenosis. No aneurysm is identified. . This document

## 2025-02-20 NOTE — ED NOTES
Pt resting on cot w/ eyes open upon entrance. Respirations even and unlabored. Blankets provided for pt comfort.

## 2025-02-20 NOTE — PROGRESS NOTES
[facility]  INPATIENT OCCUPATIONAL THERAPY  STRZ RENAL TELEMETRY 6K  EVALUATION      Discharge Recommendations: Home with assist PRN, Home with Home health OT  Equipment Recommendations: No        Time In: 0915  Time Out: 09  Timed Code Treatment Minutes: 14 Minutes  Minutes: 23          Date: 2025  Patient Name: Arun Collazo,   Gender: male      MRN: [id number]  : 1965  (59 y.o.)  Referring Practitioner: Nadeem Miller PA  Diagnosis: Stroke-like symptoms  Additional Pertinent Hx: per chart review; \"Arun Collazo is a 59 y.o. male with  who presents to [facility] with facial droop and extremity weakness. Patients last known well over 24 hours prior to presentation in the ED - noted around 8 am on . Patient does have a history of prior CVA with residual left sided weakness however has noted new left side facial droop not prior on from past CVA. On chart review it was noted he did have facial droop from prior CVA so unclear if this is actually a new finding or worsening of an old CVA effect. Patient was on DAPT previously but has been off of his plavix 2/ to provider access issues and getting it prescribed to him again \"    Restrictions/Precautions:  Restrictions/Precautions: General Precautions, Fall Risk  Position Activity Restriction  Other Position/Activity Restrictions: hx L side weakness from previous CVAs    Subjective  Chart Reviewed: Yes, Orders, Progress Notes, History and Physical, Imaging  Patient assessed for rehabilitation services?: Yes  Family / Caregiver Present: No    Subjective: patient seated up in recliner upon OT arrival and agreeable to eval. patient A & O x 3. patient seated on chair alarm and activated at end of session.    Pain: reports slight low back pain. Did not rate.     Vitals: Vitals not assessed per clinical judgement, see nursing flowsheet    Social/Functional History:  Lives With: Alone  Type of Home: Apartment (on 6th floor)  Home  Layout: One level  Home Access: Stairs to enter with rails, Elevator  Entrance Stairs - Number of Steps: 4-5  Home Equipment: Walker - 4-Wheeled   Bathroom Shower/Tub: Tub/Shower unit  Bathroom Toilet: Standard  Bathroom Equipment: Grab bars in shower  Bathroom Accessibility: Walker accessible    Receives Help From: Family, Neighbor  Prior Level of Assist for ADLs: Independent  Prior Level of Assist for Homemaking: Independent  Prior Level of Assist for Transfers: Independent  Prior Level of Assist for Ambulation: Independent household ambulator, with or without device  Has the patient had two or more falls in the past year or any fall with injury in the past year?: No    Active : No  Patient's  Info: walks or insurance pays for   Occupation: On disability  Leisure & Hobbies: Planning to start silver sneakers at [facility]  Additional Comments: Pt mod I with intermittent use of 4ww. He is a community ambulator to the [employer] and [employer] [facility]. Lives in a one story apartment similar to an independent living with an attendant on call 24/7. His little sister and neighbors are supportive.    VISION: reports decreased vision; did not specify. No diplopia or visual field cut.     HEARING:  WFL    COGNITION: Decreased Insight, Decreased Problem Solving, Decreased Safety Awareness, and Impulsive    RANGE OF MOTION:  Bilateral Upper Extremity:  WFL    STRENGTH:  Bilateral Upper Extremity:  shldr 4/5 elbow flex 4+/5 ext 4/5  (F)    Hand Dominance: Ambidextrous    SENSATION:   WFL    ADL:   Grooming: Stand By Assistance.  To stand at sink and complete oral care and then stood again to wash hands. Patient abandoned 2 w/w to side  Footwear Management: Stand By Assistance.  To doff/don slipper sock seated in recliner  Toileting: Stand By Assistance.  Standing to urinate with no LOB and able to manage clothing  .    IADL:   Not Tested    BALANCE:  Sitting Balance:  Supervision.    Standing Balance: Stand By Assistance,

## 2025-02-20 NOTE — PROGRESS NOTES
Hospitalist Progress Note    Patient:  Arun Collazo      Unit/Bed:6K-18/018-A    YOB: 1965    MRN: [id number]       Acct: [id number]     PCP: Monserrat Snider PA    Date of Admission: 2/19/2025    Assessment/Plan:    Stroke-Like Symptoms with hx of previous CVA:  MRI brain obtained 2/20/25 without evidence of acute infarct  CT imaging negative for acute findings as well  Neurology consulted and following  Continue Telemetry monitoring   Resume secondary prevention- aspirin and statin   Previously on DAPT with aspirin and plavix   Given loading dose of plavix   PT/OT/SLP   NIHSS was 5 however he has residual deficits from before   Lipid panel with total cholesterol 115, HDL cholesterol 31, LDL cholesterol 56, and triglycerides of 141  Hemoglobin A1c 6.3    CAD:  Aspirin, statin, metoprolol   PRN nitro     COPD/Asthma without exacerbation:   PRN albuterol     Type II diabetes:  A1C 6.3  Metformin held  Continue low sliding scale insulin   Accu-cheks  Hypoglycemia protocol      Primary HTN:  Continue Lisinopril and metoprolol     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     HLD:  Continue Statin     Nicotine dependence:  Nicotine patch ordered  > 40 pack years     DVT Prophylaxis: [] Lovenox / [] Heparin / [] SCDs / [] Already on Systemic Anticoagulation / [] None     Disposition:    [x] Home       [] TCU       [] Rehab       [] Psych       [] SNF       [] Long Term Care Facility       [] Other-    Chief Complaint: facial droop and extremity weakness       Subjective: 59 y.o. male admitted to the hospitalist service for stroke-like symptoms. Patient endorses a headache today he rates a 5/10 in severity.  He denies chest pain.  He denies nausea or vomiting.  He reports feeling weak on his left side.  He also reports left-sided sensory deficits.  Patient had a previous stroke approximately 5-6 years ago.  Patient went for MRI today that did

## 2025-02-20 NOTE — PLAN OF CARE
Problem: Chronic Conditions and Co-morbidities  Goal: Patient's chronic conditions and co-morbidity symptoms are monitored and maintained or improved  Outcome: Progressing  Flowsheets (Taken 2/20/2025 1346)  Care Plan - Patient's Chronic Conditions and Co-Morbidity Symptoms are Monitored and Maintained or Improved:   Monitor and assess patient's chronic conditions and comorbid symptoms for stability, deterioration, or improvement   Collaborate with multidisciplinary team to address chronic and comorbid conditions and prevent exacerbation or deterioration   Update acute care plan with appropriate goals if chronic or comorbid symptoms are exacerbated and prevent overall improvement and discharge     Problem: Discharge Planning  Goal: Discharge to home or other facility with appropriate resources  Outcome: Progressing  Flowsheets (Taken 2/20/2025 1346)  Discharge to home or other facility with appropriate resources:   Identify barriers to discharge with patient and caregiver   Identify discharge learning needs (meds, wound care, etc)   Refer to discharge planning if patient needs post-hospital services based on physician order or complex needs related to functional status, cognitive ability or social support system   Arrange for needed discharge resources and transportation as appropriate  Note: Patient plans to return home upon discharge. Will continue to monitor     Problem: Pain  Goal: Verbalizes/displays adequate comfort level or baseline comfort level  Outcome: Progressing  Flowsheets (Taken 2/20/2025 1346)  Verbalizes/displays adequate comfort level or baseline comfort level:   Encourage patient to monitor pain and request assistance   Administer analgesics based on type and severity of pain and evaluate response   Assess pain using appropriate pain scale   Implement non-pharmacological measures as appropriate and evaluate response  Note: Patient denies pain this shift. Will continue to monitor     Problem:
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
## 2025-02-21 VITALS
TEMPERATURE: 97.5 F | HEART RATE: 75 BPM | DIASTOLIC BLOOD PRESSURE: 69 MMHG | BODY MASS INDEX: 36.39 KG/M2 | SYSTOLIC BLOOD PRESSURE: 125 MMHG | OXYGEN SATURATION: 96 % | WEIGHT: 218.4 LBS | HEIGHT: 65 IN | RESPIRATION RATE: 16 BRPM

## 2025-02-21 LAB
GLUCOSE BLD STRIP.AUTO-MCNC: 104 MG/DL (ref 70–108)
GLUCOSE BLD STRIP.AUTO-MCNC: 113 MG/DL (ref 70–108)
GLUCOSE BLD STRIP.AUTO-MCNC: 117 MG/DL (ref 70–108)

## 2025-02-21 PROCEDURE — 97110 THERAPEUTIC EXERCISES: CPT

## 2025-02-21 PROCEDURE — 2500000003 HC RX 250 WO HCPCS: Performed by: PHYSICIAN ASSISTANT

## 2025-02-21 PROCEDURE — 97530 THERAPEUTIC ACTIVITIES: CPT

## 2025-02-21 PROCEDURE — 99239 HOSP IP/OBS DSCHRG MGMT >30: CPT

## 2025-02-21 PROCEDURE — 6370000000 HC RX 637 (ALT 250 FOR IP): Performed by: PHYSICIAN ASSISTANT

## 2025-02-21 PROCEDURE — 97129 THER IVNTJ 1ST 15 MIN: CPT

## 2025-02-21 PROCEDURE — 94640 AIRWAY INHALATION TREATMENT: CPT

## 2025-02-21 PROCEDURE — 82948 REAGENT STRIP/BLOOD GLUCOSE: CPT

## 2025-02-21 PROCEDURE — 97116 GAIT TRAINING THERAPY: CPT

## 2025-02-21 PROCEDURE — 97130 THER IVNTJ EA ADDL 15 MIN: CPT

## 2025-02-21 PROCEDURE — 6360000002 HC RX W HCPCS: Performed by: PHYSICIAN ASSISTANT

## 2025-02-21 PROCEDURE — 97535 SELF CARE MNGMENT TRAINING: CPT

## 2025-02-21 RX ORDER — LIDOCAINE 4 G/G
1 PATCH TOPICAL EVERY 24 HOURS
Qty: 30 EACH | Refills: 0 | Status: SHIPPED | OUTPATIENT
Start: 2025-02-21 | End: 2025-03-23

## 2025-02-21 RX ORDER — ALBUTEROL SULFATE 90 UG/1
2 INHALANT RESPIRATORY (INHALATION)
Status: DISCONTINUED | OUTPATIENT
Start: 2025-02-21 | End: 2025-02-21 | Stop reason: HOSPADM

## 2025-02-21 RX ADMIN — FUROSEMIDE 20 MG: 20 TABLET ORAL at 08:27

## 2025-02-21 RX ADMIN — ALBUTEROL SULFATE 2 PUFF: 90 AEROSOL, METERED RESPIRATORY (INHALATION) at 07:49

## 2025-02-21 RX ADMIN — ESCITALOPRAM OXALATE 20 MG: 20 TABLET ORAL at 08:27

## 2025-02-21 RX ADMIN — ENOXAPARIN SODIUM 40 MG: 100 INJECTION SUBCUTANEOUS at 08:27

## 2025-02-21 RX ADMIN — POTASSIUM CHLORIDE 10 MEQ: 1500 TABLET, EXTENDED RELEASE ORAL at 08:27

## 2025-02-21 RX ADMIN — LISINOPRIL 10 MG: 10 TABLET ORAL at 08:27

## 2025-02-21 RX ADMIN — ALBUTEROL SULFATE 2 PUFF: 90 AEROSOL, METERED RESPIRATORY (INHALATION) at 16:00

## 2025-02-21 RX ADMIN — ATORVASTATIN CALCIUM 40 MG: 40 TABLET, FILM COATED ORAL at 08:27

## 2025-02-21 RX ADMIN — SODIUM CHLORIDE, PRESERVATIVE FREE 10 ML: 5 INJECTION INTRAVENOUS at 08:27

## 2025-02-21 RX ADMIN — ALBUTEROL SULFATE 2 PUFF: 90 AEROSOL, METERED RESPIRATORY (INHALATION) at 11:41

## 2025-02-21 RX ADMIN — METOPROLOL TARTRATE 25 MG: 25 TABLET, FILM COATED ORAL at 08:27

## 2025-02-21 NOTE — PROGRESS NOTES
Level of Assist for Ambulation: Independent household ambulator, with or without device  Has the patient had two or more falls in the past year or any fall with injury in the past year?: No    Active : No  Patient's  Info: walks or insurance pays for   Occupation: On disability  Leisure & Hobbies: Planning to start silver sneakers at [facility]  Additional Comments: Pt mod I with intermittent use of 4ww. He is a community ambulator to the store and [employer] [facility] [employer]. Lives in a one story apartment similar to an independent living with an attendant on call 24/7. His little sister and neighbors are supportive.    SUBJECTIVE: Patient walking into bathroom with PCT upon arrival; agreeable to transition to OT.  Patient pleasant and cooperative throughout, hopeful to go home today.     PAIN: 0/10:      Vitals: Vitals not assessed per clinical judgement, see nursing flowsheet    COGNITION: WFL    ADL:   Grooming: Supervision.  Standing at sink to wash hands  Footwear Management: Supervision.  Seated EOB  Toileting: Supervision.     Toilet Transfer: Supervision. Standard toilet .  Patient educated regarding shower chair to increase safety and independence with activity, notes to purchase one in future.    BALANCE:  Sitting Balance:  Modified Independent.    Standing Balance: Supervision. RW, no LOB    BED MOBILITY:  Sit to Supine: Supervision, X 1, with head of bed flat, with rail, with increased time for completion    Scooting: Supervision, X 1, with head of bed flat, with rail, with increased time for completion      TRANSFERS:  Sit to Stand:  Supervision, X 1, with increased time for completion, to/from chair without arms.    Stand to Sit: Supervision, X 1, with increased time for completion, to/from chair without arms, cues for alignment to surface and for safety with assistive device.      FUNCTIONAL MOBILITY:  Assistive Device: Rolling Walker  Assist Level:  Supervision.   Distance: To and from bathroom and Household  distances within unit  Steady pace, no LOB     ADDITIONAL ACTIVITIES:  Patient completed BUE strengthening exercises with skilled education on HEP: completed x10 reps x1 set with a moderate resistance band in all joints and all planes in order to improve UE strength and activity tolerance required for BADL routine and toilet / shower transfers. Patient tolerated good, requiring minimal rest breaks. Patient also required minimal verbal/visual cues for technique.     Functional Outcome Measures:   AM-PAC Inpatient Daily Activity Raw Score: 24     Modified [place]:  Current Functional Status:  +1 - No significant disability despite symptoms; able to carry out all usual duties and activities.   Patient can do everything they could do right before their stroke, but is slower and can't do as much.  Education provided regarding stroke rehabilitation management.    ASSESSMENT:     Activity Tolerance:  Patient tolerance of  treatment: Good treatment tolerance       Plan: Times Per Week: 4-5xC  Times Per Day: Once a day  Current Treatment Recommendations: Strengthening, Balance training, Functional mobility training, Endurance training, Neuromuscular re-education, Positioning, Patient/Caregiver education & training, Safety education & training, Self-Care / ADL, Pain management, Coordination training    Education:  Learners: Patient  Role of OT, Plan of Care, ADL's, IADL's, Energy Conservation, Home Exercise Program, Equipment Education, Importance of Increasing Activity, Fall Prevention, and Assistive Device Safety    Goals  Short Term Goals  Time Frame for Short Term Goals: by discharge  Short Term Goal 1: patient will tolerate 5-6 min functional standing with (S) to increase ease with toileting and grooming.  Short Term Goal 2: patient will functionally ambulate household distances with (S) with least restrictive device.  Short Term Goal 3: patient will complete ADL routine with MOD (I) and 0-1 cues for safety.  Short Term

## 2025-02-21 NOTE — CARE COORDINATION
Met with Arun, discussed plan for discharge tomorrow. He denies all discharge needs. Electronically signed by Mario Vega RN on 2/21/2025 at 2:00 PM

## 2025-02-21 NOTE — PROGRESS NOTES
Discharge teaching and instructions for diagnosis/procedure of stroke like symp completed with patient using teachback method. AVS reviewed. Printed prescriptions given to patient. Patient voiced understanding regarding prescriptions, follow up appointments, and care of self at home. Discharged ambulatory to  home with support per [facility] cab.

## 2025-02-21 NOTE — RT PROTOCOL NOTE
RT Inhaler-Nebulizer Bronchodilator Protocol Note    There is a bronchodilator order in the chart from a provider indicating to follow the RT Bronchodilator Protocol and there is an “Initiate RT Inhaler-Nebulizer Bronchodilator Protocol” order as well (see protocol at bottom of note).    CXR Findings:  XR CHEST PORTABLE    Result Date: 2/19/2025  Impression: 1. No acute infiltrate. This document has been electronically signed by: Selina Macdonald MD on 02/19/2025 07:25 PM      The findings from the last RT Protocol Assessment were as follows:   History Pulmonary Disease: Chronic pulmonary disease  Respiratory Pattern: Dyspnea on exertion or RR 21-25 bpm  Breath Sounds: Inspiratory and expiratory or bilateral wheezing and/or rhonchi  Cough: Strong, productive  Indication for Bronchodilator Therapy: Decreased or absent breath sounds, On home bronchodilators  Bronchodilator Assessment Score: 11    Aerosolized bronchodilator medication orders have been revised according to the RT Inhaler-Nebulizer Bronchodilator Protocol below.    Respiratory Therapist to perform RT Therapy Protocol Assessment initially then follow the protocol.  Repeat RT Therapy Protocol Assessment PRN for score 0-3 or on second treatment, BID, and PRN for scores above 3.    No Indications - adjust the frequency to every 6 hours PRN wheezing or bronchospasm, if no treatments needed after 48 hours then discontinue using Per Protocol order mode.     If indication present, adjust the RT bronchodilator orders based on the Bronchodilator Assessment Score as indicated below.  Use Inhaler orders unless patient has one or more of the following: on home nebulizer, not able to hold breath for 10 seconds, is not alert and oriented, cannot activate and use MDI correctly, or respiratory rate 25 breaths per minute or more, then use the equivalent nebulizer order(s) with same Frequency and PRN reasons based on the score.  If a patient is on this medication at home

## 2025-02-21 NOTE — PLAN OF CARE
Problem: Chronic Conditions and Co-morbidities  Goal: Patient's chronic conditions and co-morbidity symptoms are monitored and maintained or improved  2/20/2025 2333 by Betty Whyte RN  Outcome: Progressing  Flowsheets (Taken 2/20/2025 2333)  Care Plan - Patient's Chronic Conditions and Co-Morbidity Symptoms are Monitored and Maintained or Improved: Monitor and assess patient's chronic conditions and comorbid symptoms for stability, deterioration, or improvement     Problem: Discharge Planning  Goal: Discharge to home or other facility with appropriate resources  2/20/2025 2333 by Betty Whyte RN  Outcome: Progressing  Flowsheets (Taken 2/20/2025 2333)  Discharge to home or other facility with appropriate resources:   Identify barriers to discharge with patient and caregiver   Identify discharge learning needs (meds, wound care, etc)     Problem: Pain  Goal: Verbalizes/displays adequate comfort level or baseline comfort level  2/20/2025 2333 by Betty Whyte RN  Outcome: Progressing  Flowsheets (Taken 2/20/2025 2333)  Verbalizes/displays adequate comfort level or baseline comfort level:   Encourage patient to monitor pain and request assistance   Assess pain using appropriate pain scale   Administer analgesics based on type and severity of pain and evaluate response   Implement non-pharmacological measures as appropriate and evaluate response     Problem: Safety - Adult  Goal: Free from fall injury  2/20/2025 2333 by Betty Whyte RN  Outcome: Progressing  Flowsheets (Taken 2/20/2025 2333)  Free From Fall Injury: Instruct family/caregiver on patient safety     Problem: ABCDS Injury Assessment  Goal: Absence of physical injury  2/20/2025 2333 by Betty Whyte RN  Outcome: Progressing  Flowsheets (Taken 2/20/2025 2333)  Absence of Physical Injury: Implement safety measures based on patient assessment

## 2025-02-21 NOTE — PROGRESS NOTES
Physician Progress Note      PATIENT:               KATELIN SERNA  [facility] #:                  [id number]  :                       1965  ADMIT DATE:       2025 4:40 PM  DISCH DATE:  RESPONDING  PROVIDER #:        Emmanuel Velazquez DO          QUERY TEXT:    Pt admitted with left sided weakness and facial droop. Pt noted to have prior   CVA. If possible, please document in progress notes and discharge summary if   you are evaluating and/or treating any of the following:    The medical record reflects the following:  Risk Factors: hx CVA with residual left sided deficits  Clinical Indicators: presented with left sided weakness and facial droop,   noted to have have history of CVA with residual left sided deficits; neurology   noted old CVA and signed off without further workup warranted per the consult   note   Treatment: Labs, imaging, monitoring, Neurology consult  Options provided:  -- Left sided weakness and facial droop is a sequela of prior CVA  -- Left sided weakness and facial droop due to, Please document cause of   symptoms.  -- Other - I will add my own diagnosis  -- Disagree - Not applicable / Not valid  -- Disagree - Clinically unable to determine / Unknown  -- Refer to Clinical Documentation Reviewer    PROVIDER RESPONSE TEXT:    Provider is clinically unable to determine a response to this query.  please ask Neurology who was onsulted before I took over case    Query created by: Rosalia Kilgore on 2025 11:58 AM      Electronically signed by:  Emmanuel Velazquez DO 2025 2:47 PM

## 2025-02-21 NOTE — PROGRESS NOTES
Pt was alone and could not talk much but wanted prayer to cope and heal. He was blessed.    02/21/25 1632   Encounter Summary   Encounter Overview/Reason Loneliness/Social Isolation   Service Provided For Patient   Referral/Consult From [facility]   Support System Family members   Last Encounter  02/21/25   Complexity of Encounter Low   Begin Time 1300   End Time  1306   Total Time Calculated 6 min   Spiritual/Emotional needs   Type Spiritual Support   Assessment/Intervention/Outcome   Assessment Calm   Intervention Active listening   Outcome Encouraged

## 2025-02-21 NOTE — DISCHARGE SUMMARY
Hospital Medicine Discharge Summary      Patient Identification:   Arun Collazo   : 1965  MRN: [id number]   Account: [id number]      Patient's PCP: Monserrat Snider PA    Admit Date: 2025     Discharge Date: 2025      Admitting Physician: Lamine Banegas MD     Discharge Physician: Emmanuel Velazquez DO     Discharge Diagnoses:    History of CVA  Strokelike symptoms  Coronary artery disease  COPD/bronchial asthma  Type 2 diabetes mellitus  Primary hypertension  Depression/anxiety  Active smoker.  History of DVT/PE    The patient was seen and examined on day of discharge and this discharge summary is in conjunction with any daily progress note from day of discharge.    Hospital Course:   Arun Collazo is a 59 y.o. male admitted to [facility] on 2025 for strokelike symptoms.  Initial CT head negative for acute pathology..      Initial HPI  Arun Collazo is a 59 y.o. male with  who presents to [facility] with facial droop and extremity weakness. Patients last known well over 24 hours prior to presentation in the ED - noted around 8 am on . Patient does have a history of prior CVA with residual left sided weakness however has noted new left side facial droop not prior on from past CVA. On chart review it was noted he did have facial droop from prior CVA so unclear if this is actually a new finding or worsening of an old CVA effect. Patient was on DAPT previously but has been off of his plavix 2 to provider access issues and getting it prescribed to him again     I took over care of this patient on 2025.  Patient underwent MRI brain without contrast, no acute new stroke found.  Patient was also consulted seen and followed by neurology service since admission, no stroke confirmed.  Okay to discharge from neurology standpoint    Patient is stable, afebrile with stable signs.  Patient is being discharged to home.  Patient will need neurology and  radiation to a low as reasonably achievable.      Carotid stenosis and measurements are in accordance with NASCET criteria.      3D Post-processing was performed on this study.      XR CHEST PORTABLE   Final Result   Impression:   1. No acute infiltrate.      This document has been electronically signed by: Selina Macdonald MD on    02/19/2025 07:25 PM             Consults:     IP CONSULT TO NEUROLOGY  IP CONSULT TO SPIRITUAL SERVICES    Disposition: Home  Condition at Discharge: Stable    Code Status:  Prior     Patient Instructions:    Discharge lab work:   Activity: activity as tolerated  Diet: No diet orders on file      Follow-up visits:   Monserrat Snider, PA  [address]  [facility] [id number] [phone]    Schedule an appointment as soon as possible for a visit  The office will call to schedule a 1 week hospital follow up appointment.    Shivam Carrington MD  [address]  [place] [id number] [phone]    Follow up in 2 week(s)           Discharge Medications:        Medication List        START taking these medications      lidocaine 4 % external patch  Place 1 patch onto the skin every 24 hours     Plavix 75 MG tablet  Generic drug: clopidogrel            CONTINUE taking these medications      atorvastatin 40 MG tablet  Commonly known as: LIPITOR     doxepin 100 MG capsule  Commonly known as: SINEQUAN     Flovent  MCG/ACT inhaler  Generic drug: fluticasone     furosemide 20 MG tablet  Commonly known as: LASIX     ipratropium 17 MCG/ACT inhaler  Commonly known as: Atrovent HFA  Inhale 2 puffs into the lungs every 6 hours     Lexapro 20 MG tablet  Generic drug: escitalopram     lisinopril 10 MG tablet  Commonly known as: PRINIVIL;ZESTRIL     metoprolol tartrate 25 MG tablet  Commonly known as: LOPRESSOR  Take 1 tablet by mouth 2 times daily     nitroGLYCERIN 0.4 MG SL tablet  Commonly known as: NITROSTAT     NONFORMULARY     potassium chloride 10 MEQ extended release

## 2025-02-21 NOTE — PROGRESS NOTES
[facility]  INPATIENT PHYSICAL THERAPY  DAILY NOTE  STRZ RENAL TELEMETRY 6K - 6K-18/018-A      Discharge Recommendations: Home with Assist as Needed and Home with Outpatient PT for additional strengthening and balance  Equipment Recommendations: No             Time In: 0855  Time Out: 918  Timed Code Treatment Minutes: 23 Minutes  Minutes: 23          Date: 2025  Patient Name: Arun Collazo,  Gender:  male        MRN: [id number]  : 1965  (59 y.o.)     Referring Practitioner: Nadeem Miller PA  Diagnosis: Stroke-like symptoms  Additional Pertinent Hx: Per EMR:Arun Collazo is a 59 y.o. male with  who presents to [facility] with facial droop and extremity weakness. Patients last known well over 24 hours prior to presentation in the ED - noted around 8 am on . Patient does have a history of prior CVA with residual left sided weakness however has noted new left side facial droop not prior on from past CVA. On chart review it was noted he did have facial droop from prior CVA so unclear if this is actually a new finding or worsening of an old CVA effect. Patient was on DAPT previously but has been off of his plavix  to provider access issues and getting it prescribed to him again. MRI negative, CT and CTA of head and neck negative for acute finding.     Prior Level of Function:  Lives With: Alone  Type of Home: Apartment (on 6th floor)  Home Layout: One level  Home Access: Stairs to enter with rails, Elevator  Entrance Stairs - Number of Steps: 4-5  Home Equipment: Walker - 4-Wheeled   Bathroom Shower/Tub: Tub/Shower unit  Bathroom Toilet: Standard  Bathroom Equipment: Grab bars in shower  Bathroom Accessibility: Walker accessible    Receives Help From: Family, Neighbor  Prior Level of Assist for ADLs: Independent  Prior Level of Assist for Homemaking: Independent  Prior Level of Assist for Transfers: Independent  Active : No  Additional Comments: Pt mod I with  Balance: Supervision  No LOB with functional tasks  Exercise:  Patient was guided in 1 set(s) 10 reps of exercises to left lower extremities: Heelslides, Hip abduction/adduction and Straight leg raises.  Exercises were completed for increased independence with functional mobility. Pt required VC and visual cues for proper technique of exercises with good demo.   *pt instructed in strengthening tasks, 10 reps of sit to.from stand transfers for 3 sets, no use of B UE to focus on B LE strength, cues for technique with good demo, short rest break between sets    Functional Outcome Measures:  [facility] (6 CLICK) BASIC MOBILITY  AM-[facility] Inpatient Mobility Raw Score : 22  AM-PAC Inpatient T-Scale Score : 53.28        Modified [place]:  Current Functional Status:  +2 - Slight disability; unable to carry out all previous activities, but able to look after own affairs without assistance.   Patient could live alone without any help from another person.  Education provided regarding stroke rehabilitation management.    ASSESSMENT:  Assessment: Patient progressing toward established goals.  Activity Tolerance:  Patient tolerance of  treatment:Good.  Plan: Current Treatment Recommendations: Strengthening, ROM, Balance training, Functional mobility training, Transfer training, Cognitive/Perceptual training, Endurance training, Gait training, Stair training, Neuromuscular re-education, Home exercise program, Safety education & training, Patient/Caregiver education & training, Equipment evaluation, education, & procurement, Therapeutic activities  General Plan:  (5x C)    Education:  Learners: Patient  Patient Education: Plan of Care, Gait, Stairs, Use of Gait Belt, Verbal Exercise Instruction, Home Safety Education,  - Patient Verbalized Understanding, - Patient Requires Continued Education    Goals:  Patient Goals : to start silver sneakers  Short Term Goals  Time Frame for Short Term Goals: by hospital d/c  Short Term Goal 1: Pt to

## 2025-02-21 NOTE — PROGRESS NOTES
[facility]  INPATIENT SPEECH THERAPY  STRZ RENAL TELEMETRY 6K  DAILY NOTE    Discharge Recommendations: Home with Home Exercise Program    SLP Individual Minutes  Time In: 1150  Time Out: 1215  Minutes: 25  Timed Code Treatment Minutes: 25 Minutes       Date: 2025  Patient Name: Arun Collazo      CSN: [id number]   : 1965  (59 y.o.)  Gender: male   Referring Physician:  Nadeem Miller PA   Diagnosis: Stroke-like symptoms  Precautions: Fall risk   Current Diet: Regular diet and thin liquids   Respiratory Status: Room Air  Swallowing Strategies: Standard Universal Swallow Precautions  Date of Last MBS/FEES: Not Applicable    Pain:  No pain reported.    Subjective:  Session approved by RNJackeline. Patient seen sitting on EOB consuming lunch. Alert and cooperative. Coughing present x2 during meal consumption. x1 while conversing with ST during meal consumption and x1 with significantly delayed cough that did NOT appear directly related to PO intake. ?Baseline cough. Will continue to monitor. Patient denies overt s/s of airway invasion. CSE completed on  with recommendations for patient to consume a regular diet and thin liquids.     Short-Term Goals:  SHORT TERM GOAL #1:  Goal 1: Patient will complete complex sustained/selective/divided/alternating attention tasks with less than 3 errors in 5 minutes to improve mental focus for ADLs/IADLS.  INTERVENTIONS: DNT d/t focus on other goals     SHORT TERM GOAL #2:  Goal 2: Patient will complete complex thought organization and sequencing tasks with 70% accuracy and moderate cues to improve mental flexibility for daily living.  INTERVENTIONS: Organization of calendar (complex): 9/10 indep, 1/10 min cues  *wrong date x1 with min cues to correct    Calculating bill/coin amounts: 4/6 indep, 2/6 min cues   *decreased vision with lack of glasses    Money word problems r/t basketball ticket sales: 3/5 indep, 2/5 min cues  *decreased organizational set up

## 2025-02-22 PROBLEM — R53.1 LEFT-SIDED WEAKNESS: Status: ACTIVE | Noted: 2025-02-22

## 2025-02-22 PROBLEM — F17.210 TOBACCO SMOKER, LESS THAN 10 CIGARETTES PER DAY: Status: ACTIVE | Noted: 2025-02-22

## 2025-02-22 PROBLEM — Z86.73 HISTORY OF CVA (CEREBROVASCULAR ACCIDENT): Status: ACTIVE | Noted: 2025-02-22

## 2025-04-01 ENCOUNTER — OFFICE VISIT (OUTPATIENT)
Dept: CARDIOLOGY CLINIC | Age: 60
End: 2025-04-01

## 2025-04-01 VITALS
HEART RATE: 108 BPM | WEIGHT: 218 LBS | HEIGHT: 65 IN | SYSTOLIC BLOOD PRESSURE: 134 MMHG | DIASTOLIC BLOOD PRESSURE: 84 MMHG | BODY MASS INDEX: 36.32 KG/M2

## 2025-04-01 DIAGNOSIS — R06.02 SHORTNESS OF BREATH: Primary | ICD-10-CM

## 2025-04-01 RX ORDER — SERTRALINE HYDROCHLORIDE 100 MG/1
100 TABLET, FILM COATED ORAL DAILY
COMMUNITY
Start: 2025-03-17

## 2025-04-01 RX ORDER — LAMOTRIGINE 100 MG/1
TABLET ORAL
COMMUNITY
Start: 2025-03-19

## 2025-04-01 NOTE — PROGRESS NOTES
[facility] [place] [facility]  [facility] CARDIOLOGY  [address]  [address]  [place] [address]  Dept: [phone]  Dept Fax: [phone]  Loc: [phone]    Visit Date: 4/1/2025    Chief Complaint   Patient presents with    New Patient       HPI:   Mr. Collazo is a 59 y.o. male  who presented for:  History of Present Illness  The patient presents for evaluation of severe COPD and cardiac issues.    He has a history of 16 cerebrovascular accidents and was recently hospitalized due to symptoms suggestive of a stroke. He has a single cardiac stent in place. His primary care physician referred him to our clinic for further evaluation. He is able to ambulate with the aid of a walker, covering a distance of approximately 4 blocks from his residence to a nearby [employer] and back. He has recently enrolled in the [employer] program at the [facility], which includes water aerobics, in an effort to improve his health. He has also made dietary modifications and incorporated mild physical activity into his routine.    He has been diagnosed with severe chronic obstructive pulmonary disease. Despite this, he continues to smoke, although he has reduced his consumption to 5 cigarettes per day. He is currently under the care of a pulmonologist. He is using nicotine pouches as part of his smoking cessation efforts.    SOCIAL HISTORY  The patient is down to smoking 5 cigarettes a day.      Current Outpatient Medications:     lamoTRIgine (LAMICTAL) 100 MG tablet, , Disp: , Rfl:     sertraline (ZOLOFT) 100 MG tablet, Take 1 tablet by mouth daily, Disp: , Rfl:     metoprolol tartrate (LOPRESSOR) 25 MG tablet, Take 1 tablet by mouth 2 times daily, Disp: 60 tablet, Rfl: 11    potassium chloride (KLOR-CON M) 10 MEQ extended release tablet, Take 1 tablet by mouth daily, Disp: , Rfl:     doxepin (SINEQUAN) 100 MG capsule, Take 1 capsule by mouth nightly, Disp: , Rfl:     lisinopril (PRINIVIL;ZESTRIL)

## 2025-04-23 ENCOUNTER — HOSPITAL ENCOUNTER (OUTPATIENT)
Dept: NUCLEAR MEDICINE | Age: 60
Discharge: HOME | End: 2025-04-23
Attending: INTERNAL MEDICINE
Payer: MEDICARE

## 2025-04-23 ENCOUNTER — HOSPITAL ENCOUNTER (OUTPATIENT)
Age: 60
Discharge: HOME | End: 2025-04-25
Attending: INTERNAL MEDICINE
Payer: MEDICARE

## 2025-04-23 VITALS — SYSTOLIC BLOOD PRESSURE: 134 MMHG | DIASTOLIC BLOOD PRESSURE: 84 MMHG

## 2025-04-23 VITALS — HEIGHT: 65 IN | WEIGHT: 216 LBS | BODY MASS INDEX: 35.99 KG/M2

## 2025-04-23 DIAGNOSIS — R06.02 SHORTNESS OF BREATH: ICD-10-CM

## 2025-04-23 LAB
ECHO AO ASC DIAM: 3 CM
ECHO AO ASCENDING AORTA INDEX: 1.47 CM/M2
ECHO AV CUSP MM: 1.5 CM
ECHO AV PEAK GRADIENT: 9 MMHG
ECHO AV PEAK VELOCITY: 1.5 M/S
ECHO AV VELOCITY RATIO: 0.6
ECHO BSA: 2.12 M2
ECHO EST RA PRESSURE: 5 MMHG
ECHO LA AREA 2C: 14.5 CM2
ECHO LA AREA 4C: 16.6 CM2
ECHO LA DIAMETER INDEX: 1.57 CM/M2
ECHO LA DIAMETER: 3.2 CM
ECHO LA MAJOR AXIS: 5.6 CM
ECHO LA MINOR AXIS: 4.8 CM
ECHO LA VOL BP: 40 ML (ref 18–58)
ECHO LA VOL MOD A2C: 36 ML (ref 18–58)
ECHO LA VOL MOD A4C: 38 ML (ref 18–58)
ECHO LA VOL/BSA BIPLANE: 20 ML/M2 (ref 16–34)
ECHO LA VOLUME INDEX MOD A2C: 18 ML/M2 (ref 16–34)
ECHO LA VOLUME INDEX MOD A4C: 19 ML/M2 (ref 16–34)
ECHO LV E' LATERAL VELOCITY: 10.7 CM/S
ECHO LV E' SEPTAL VELOCITY: 7.4 CM/S
ECHO LV EF PHYSICIAN: 60 %
ECHO LV FRACTIONAL SHORTENING: 39 % (ref 28–44)
ECHO LV INTERNAL DIMENSION DIASTOLE INDEX: 2.4 CM/M2
ECHO LV INTERNAL DIMENSION DIASTOLIC: 4.9 CM (ref 4.2–5.9)
ECHO LV INTERNAL DIMENSION SYSTOLIC INDEX: 1.47 CM/M2
ECHO LV INTERNAL DIMENSION SYSTOLIC: 3 CM
ECHO LV ISOVOLUMETRIC RELAXATION TIME (IVRT): 39 MS
ECHO LV IVSD: 1.1 CM (ref 0.6–1)
ECHO LV MASS 2D: 188.1 G (ref 88–224)
ECHO LV MASS INDEX 2D: 92.2 G/M2 (ref 49–115)
ECHO LV POSTERIOR WALL DIASTOLIC: 1 CM (ref 0.6–1)
ECHO LV RELATIVE WALL THICKNESS RATIO: 0.41
ECHO LVOT PEAK GRADIENT: 3 MMHG
ECHO LVOT PEAK VELOCITY: 0.9 M/S
ECHO MV A VELOCITY: 0.78 M/S
ECHO MV E DECELERATION TIME (DT): 224 MS
ECHO MV E VELOCITY: 0.78 M/S
ECHO MV E/A RATIO: 1
ECHO MV E/E' LATERAL: 7.29
ECHO MV E/E' RATIO (AVERAGED): 8.92
ECHO MV E/E' SEPTAL: 10.54
ECHO PV MAX VELOCITY: 1 M/S
ECHO PV PEAK GRADIENT: 4 MMHG
ECHO RIGHT VENTRICULAR SYSTOLIC PRESSURE (RVSP): 35 MMHG
ECHO RV INTERNAL DIMENSION: 2.6 CM
ECHO RV TAPSE: 1.7 CM (ref 1.7–?)
ECHO TV E WAVE: 0.5 M/S
ECHO TV REGURGITANT MAX VELOCITY: 2.73 M/S
ECHO TV REGURGITANT PEAK GRADIENT: 30 MMHG

## 2025-04-23 PROCEDURE — 78452 HT MUSCLE IMAGE SPECT MULT: CPT

## 2025-04-23 PROCEDURE — 93017 CV STRESS TEST TRACING ONLY: CPT

## 2025-04-23 PROCEDURE — A9500 TC99M SESTAMIBI: HCPCS | Performed by: INTERNAL MEDICINE

## 2025-04-23 PROCEDURE — 6360000002 HC RX W HCPCS: Performed by: INTERNAL MEDICINE

## 2025-04-23 PROCEDURE — 93306 TTE W/DOPPLER COMPLETE: CPT

## 2025-04-23 PROCEDURE — 3430000000 HC RX DIAGNOSTIC RADIOPHARMACEUTICAL: Performed by: INTERNAL MEDICINE

## 2025-04-23 RX ORDER — TETRAKIS(2-METHOXYISOBUTYLISOCYANIDE)COPPER(I) TETRAFLUOROBORATE 1 MG/ML
8.5 INJECTION, POWDER, LYOPHILIZED, FOR SOLUTION INTRAVENOUS
Status: COMPLETED | OUTPATIENT
Start: 2025-04-23 | End: 2025-04-23

## 2025-04-23 RX ORDER — REGADENOSON 0.08 MG/ML
0.4 INJECTION, SOLUTION INTRAVENOUS
Status: COMPLETED | OUTPATIENT
Start: 2025-04-23 | End: 2025-04-23

## 2025-04-23 RX ORDER — TETRAKIS(2-METHOXYISOBUTYLISOCYANIDE)COPPER(I) TETRAFLUOROBORATE 1 MG/ML
31.1 INJECTION, POWDER, LYOPHILIZED, FOR SOLUTION INTRAVENOUS
Status: COMPLETED | OUTPATIENT
Start: 2025-04-23 | End: 2025-04-23

## 2025-04-23 RX ADMIN — REGADENOSON 0.4 MG: 0.08 INJECTION, SOLUTION INTRAVENOUS at 15:18

## 2025-04-23 RX ADMIN — Medication 31.1 MILLICURIE: at 15:20

## 2025-04-23 RX ADMIN — Medication 8.5 MILLICURIE: at 14:35

## 2025-04-24 ENCOUNTER — TELEPHONE (OUTPATIENT)
Dept: CARDIOLOGY CLINIC | Age: 60
End: 2025-04-24

## 2025-04-24 DIAGNOSIS — R94.39 ABNORMAL STRESS TEST: Primary | ICD-10-CM

## 2025-04-24 LAB
ECHO BSA: 2.12 M2
NUC STRESS EJECTION FRACTION: 61 %
STRESS BASELINE DIAS BP: 67 MMHG
STRESS BASELINE HR: 83 BPM
STRESS BASELINE SYS BP: 134 MMHG
STRESS ESTIMATED WORKLOAD: 1 METS
STRESS PEAK DIAS BP: 67 MMHG
STRESS PEAK SYS BP: 134 MMHG
STRESS PERCENT HR ACHIEVED: 62 %
STRESS POST PEAK HR: 100 BPM
STRESS RATE PRESSURE PRODUCT: NORMAL BPM*MMHG
STRESS STAGE 1 DURATION: 1 MIN:SEC
STRESS STAGE 1 HR: 92 BPM
STRESS STAGE 2 BP: NORMAL MMHG
STRESS STAGE 2 DURATION: 1 MIN:SEC
STRESS STAGE 2 HR: 88 BPM
STRESS STAGE 3 BP: NORMAL MMHG
STRESS STAGE 3 DURATION: 1 MIN:SEC
STRESS STAGE 3 HR: 88 BPM
STRESS STAGE RECOVERY 1 BP: NORMAL MMHG
STRESS STAGE RECOVERY 1 DURATION: 1 MIN:SEC
STRESS STAGE RECOVERY 1 HR: 88 BPM
STRESS STAGE RECOVERY 2 BP: NORMAL MMHG
STRESS STAGE RECOVERY 2 DURATION: 1 MIN:SEC
STRESS STAGE RECOVERY 2 HR: 85 BPM
STRESS STAGE RECOVERY 3 BP: NORMAL MMHG
STRESS STAGE RECOVERY 3 DURATION: 1 MIN:SEC
STRESS STAGE RECOVERY 3 HR: 83 BPM
STRESS STAGE RECOVERY 4 BP: NORMAL MMHG
STRESS STAGE RECOVERY 4 DURATION: 2 MIN:SEC
STRESS STAGE RECOVERY 4 HR: 81 BPM
STRESS TARGET HR: 161 BPM
TID: 1.08

## 2025-04-24 NOTE — TELEPHONE ENCOUNTER
PROCEDURE: LH CATH    DATE OF SERVICE: 5/1/2025    SERVICE LOCATION: [facility]    CPT CODE: 33921    PHYSICIAN:   DR MANZANO      DATE PRIOR AUTH SUBMITTED: 4/24/25    STATUS: APPROVED.     CASE NUMBER:     AUTH NUMBER: [id number]    VALID:  4/24/25 to 7/22/25

## 2025-04-24 NOTE — TELEPHONE ENCOUNTER
Result note from Dr Betts:    Ludin Betts MD  [facility] Clinical Staff  Patient needs cath due to abnormal stress and symptoms.    Please schedule if patient agrees.  thanks    Called pt.  No answer.  [facility]    Called pt's sister as well, she will let him know to call us back as well.

## 2025-04-24 NOTE — TELEPHONE ENCOUNTER
Schedule for left heart cath on 5/1/25, arrival time  9 am    Pt is stop lasix and all diabetic medications on 5/1/25  All other medications to take as normal    Scheduled with cody in cath lab    Call to pt, date, time and instructions reviewed with pt and emailed

## 2025-04-30 ENCOUNTER — PREP FOR PROCEDURE (OUTPATIENT)
Dept: CARDIOLOGY | Age: 60
End: 2025-04-30

## 2025-04-30 RX ORDER — SODIUM CHLORIDE 0.9 % (FLUSH) 0.9 %
5-40 SYRINGE (ML) INJECTION PRN
Status: CANCELLED | OUTPATIENT
Start: 2025-04-30

## 2025-04-30 RX ORDER — ASPIRIN 325 MG
325 TABLET ORAL ONCE
Status: CANCELLED | OUTPATIENT
Start: 2025-04-30 | End: 2025-04-30

## 2025-04-30 RX ORDER — HYDROCORTISONE SODIUM SUCCINATE 100 MG/2ML
200 INJECTION INTRAMUSCULAR; INTRAVENOUS ONCE
Status: CANCELLED | OUTPATIENT
Start: 2025-04-30 | End: 2025-04-30

## 2025-04-30 RX ORDER — SODIUM CHLORIDE 9 MG/ML
INJECTION, SOLUTION INTRAVENOUS CONTINUOUS
Status: CANCELLED | OUTPATIENT
Start: 2025-04-30

## 2025-04-30 RX ORDER — DIPHENHYDRAMINE HYDROCHLORIDE 50 MG/ML
50 INJECTION, SOLUTION INTRAMUSCULAR; INTRAVENOUS ONCE
Status: CANCELLED | OUTPATIENT
Start: 2025-04-30 | End: 2025-04-30

## 2025-04-30 RX ORDER — SODIUM CHLORIDE 9 MG/ML
INJECTION, SOLUTION INTRAVENOUS PRN
Status: CANCELLED | OUTPATIENT
Start: 2025-04-30

## 2025-04-30 RX ORDER — SODIUM CHLORIDE 0.9 % (FLUSH) 0.9 %
5-40 SYRINGE (ML) INJECTION EVERY 12 HOURS SCHEDULED
Status: CANCELLED | OUTPATIENT
Start: 2025-04-30

## 2025-04-30 RX ORDER — NITROGLYCERIN 0.4 MG/1
0.4 TABLET SUBLINGUAL EVERY 5 MIN PRN
Status: CANCELLED | OUTPATIENT
Start: 2025-04-30

## 2025-05-01 ENCOUNTER — HOSPITAL ENCOUNTER (OUTPATIENT)
Age: 60
Discharge: HOME | End: 2025-05-01
Attending: INTERNAL MEDICINE | Admitting: INTERNAL MEDICINE
Payer: MEDICARE

## 2025-05-01 ENCOUNTER — APPOINTMENT (OUTPATIENT)
Dept: GENERAL RADIOLOGY | Age: 60
End: 2025-05-01
Attending: INTERNAL MEDICINE
Payer: MEDICARE

## 2025-05-01 VITALS
SYSTOLIC BLOOD PRESSURE: 130 MMHG | RESPIRATION RATE: 19 BRPM | BODY MASS INDEX: 37.47 KG/M2 | DIASTOLIC BLOOD PRESSURE: 66 MMHG | WEIGHT: 224.9 LBS | HEIGHT: 65 IN | HEART RATE: 76 BPM | OXYGEN SATURATION: 93 % | TEMPERATURE: 98.5 F

## 2025-05-01 DIAGNOSIS — Z95.820 STATUS POST ANGIOPLASTY WITH STENT: Primary | ICD-10-CM

## 2025-05-01 DIAGNOSIS — R94.39 ABNORMAL STRESS TEST: ICD-10-CM

## 2025-05-01 LAB
ABO GROUP BLD: NORMAL
ACTIVATED CLOTTING TIME: 227 SECONDS (ref 1–150)
ANION GAP SERPL CALC-SCNC: 16 MEQ/L (ref 8–16)
BUN SERPL-MCNC: 27 MG/DL (ref 8–23)
CALCIUM SERPL-MCNC: 8.7 MG/DL (ref 8.8–10.2)
CHLORIDE SERPL-SCNC: 101 MEQ/L (ref 98–111)
CHOLEST SERPL-MCNC: 135 MG/DL (ref 100–199)
CO2 SERPL-SCNC: 19 MEQ/L (ref 22–29)
CREAT SERPL-MCNC: 0.9 MG/DL (ref 0.7–1.2)
DEPRECATED MEAN GLUCOSE BLD GHB EST-ACNC: 144 MG/DL (ref 70–126)
DEPRECATED RDW RBC AUTO: 51.8 FL (ref 35–45)
ECHO BSA: 2.16 M2
EKG ATRIAL RATE: 85 BPM
EKG P AXIS: 53 DEGREES
EKG P-R INTERVAL: 130 MS
EKG Q-T INTERVAL: 368 MS
EKG QRS DURATION: 80 MS
EKG QTC CALCULATION (BAZETT): 437 MS
EKG R AXIS: -11 DEGREES
EKG T AXIS: 43 DEGREES
EKG VENTRICULAR RATE: 85 BPM
ERYTHROCYTE [DISTWIDTH] IN BLOOD BY AUTOMATED COUNT: 18.1 % (ref 11.5–14.5)
GFR SERPL CREATININE-BSD FRML MDRD: > 90 ML/MIN/1.73M2
GLUCOSE SERPL-MCNC: 133 MG/DL (ref 74–109)
HBA1C MFR BLD HPLC: 6.8 % (ref 4–6)
HCT VFR BLD AUTO: 42.5 % (ref 42–52)
HDLC SERPL-MCNC: 66 MG/DL
HGB BLD-MCNC: 14.1 GM/DL (ref 14–18)
IAT IGG-SP REAG SERPL QL: NORMAL
INR PPP: 0.91 (ref 0.85–1.13)
LDLC SERPL CALC-MCNC: 31 MG/DL
MCH RBC QN AUTO: 27 PG (ref 26–33)
MCHC RBC AUTO-ENTMCNC: 33.2 GM/DL (ref 32.2–35.5)
MCV RBC AUTO: 81.4 FL (ref 80–94)
PLATELET # BLD AUTO: 350 THOU/MM3 (ref 130–400)
PMV BLD AUTO: 8.6 FL (ref 9.4–12.4)
POTASSIUM SERPL-SCNC: 3.7 MEQ/L (ref 3.5–5.2)
RBC # BLD AUTO: 5.22 MILL/MM3 (ref 4.7–6.1)
RH BLD: NORMAL
SODIUM SERPL-SCNC: 136 MEQ/L (ref 135–145)
TRIGL SERPL-MCNC: 192 MG/DL (ref 0–199)
WBC # BLD AUTO: 25.5 THOU/MM3 (ref 4.8–10.8)

## 2025-05-01 PROCEDURE — 6370000000 HC RX 637 (ALT 250 FOR IP): Performed by: NURSE PRACTITIONER

## 2025-05-01 PROCEDURE — 99152 MOD SED SAME PHYS/QHP 5/>YRS: CPT | Performed by: INTERNAL MEDICINE

## 2025-05-01 PROCEDURE — 93005 ELECTROCARDIOGRAM TRACING: CPT | Performed by: NURSE PRACTITIONER

## 2025-05-01 PROCEDURE — 85610 PROTHROMBIN TIME: CPT

## 2025-05-01 PROCEDURE — 2709999900 HC NON-CHARGEABLE SUPPLY: Performed by: INTERNAL MEDICINE

## 2025-05-01 PROCEDURE — C1887 CATHETER, GUIDING: HCPCS | Performed by: INTERNAL MEDICINE

## 2025-05-01 PROCEDURE — C9600 PERC DRUG-EL COR STENT SING: HCPCS | Performed by: INTERNAL MEDICINE

## 2025-05-01 PROCEDURE — C1725 CATH, TRANSLUMIN NON-LASER: HCPCS | Performed by: INTERNAL MEDICINE

## 2025-05-01 PROCEDURE — 86885 COOMBS TEST INDIRECT QUAL: CPT

## 2025-05-01 PROCEDURE — 71045 X-RAY EXAM CHEST 1 VIEW: CPT

## 2025-05-01 PROCEDURE — C1874 STENT, COATED/COV W/DEL SYS: HCPCS | Performed by: INTERNAL MEDICINE

## 2025-05-01 PROCEDURE — 7100000010 HC PHASE II RECOVERY - FIRST 15 MIN: Performed by: INTERNAL MEDICINE

## 2025-05-01 PROCEDURE — 6360000004 HC RX CONTRAST MEDICATION: Performed by: INTERNAL MEDICINE

## 2025-05-01 PROCEDURE — 99153 MOD SED SAME PHYS/QHP EA: CPT | Performed by: INTERNAL MEDICINE

## 2025-05-01 PROCEDURE — 36415 COLL VENOUS BLD VENIPUNCTURE: CPT

## 2025-05-01 PROCEDURE — 80061 LIPID PANEL: CPT

## 2025-05-01 PROCEDURE — 93458 L HRT ARTERY/VENTRICLE ANGIO: CPT | Performed by: INTERNAL MEDICINE

## 2025-05-01 PROCEDURE — C1894 INTRO/SHEATH, NON-LASER: HCPCS | Performed by: INTERNAL MEDICINE

## 2025-05-01 PROCEDURE — 7100000011 HC PHASE II RECOVERY - ADDTL 15 MIN: Performed by: INTERNAL MEDICINE

## 2025-05-01 PROCEDURE — 85027 COMPLETE CBC AUTOMATED: CPT

## 2025-05-01 PROCEDURE — 93010 ELECTROCARDIOGRAM REPORT: CPT | Performed by: NUCLEAR MEDICINE

## 2025-05-01 PROCEDURE — 80048 BASIC METABOLIC PNL TOTAL CA: CPT

## 2025-05-01 PROCEDURE — 2580000003 HC RX 258: Performed by: NURSE PRACTITIONER

## 2025-05-01 PROCEDURE — 6370000000 HC RX 637 (ALT 250 FOR IP): Performed by: INTERNAL MEDICINE

## 2025-05-01 PROCEDURE — 86900 BLOOD TYPING SEROLOGIC ABO: CPT

## 2025-05-01 PROCEDURE — 86901 BLOOD TYPING SEROLOGIC RH(D): CPT

## 2025-05-01 PROCEDURE — 85347 COAGULATION TIME ACTIVATED: CPT

## 2025-05-01 PROCEDURE — 83036 HEMOGLOBIN GLYCOSYLATED A1C: CPT

## 2025-05-01 PROCEDURE — 6360000002 HC RX W HCPCS: Performed by: INTERNAL MEDICINE

## 2025-05-01 PROCEDURE — 92928 PRQ TCAT PLMT NTRAC ST 1 LES: CPT | Performed by: INTERNAL MEDICINE

## 2025-05-01 PROCEDURE — 2500000003 HC RX 250 WO HCPCS: Performed by: INTERNAL MEDICINE

## 2025-05-01 PROCEDURE — C1769 GUIDE WIRE: HCPCS | Performed by: INTERNAL MEDICINE

## 2025-05-01 DEVICE — STENT ONYXNG35018UX ONYX 3.50X18RX
Type: IMPLANTABLE DEVICE | Status: FUNCTIONAL
Brand: ONYX FRONTIER™

## 2025-05-01 RX ORDER — DIPHENHYDRAMINE HYDROCHLORIDE 50 MG/ML
50 INJECTION, SOLUTION INTRAMUSCULAR; INTRAVENOUS ONCE
Status: DISCONTINUED | OUTPATIENT
Start: 2025-05-01 | End: 2025-05-01 | Stop reason: HOSPADM

## 2025-05-01 RX ORDER — SODIUM CHLORIDE 0.9 % (FLUSH) 0.9 %
5-40 SYRINGE (ML) INJECTION EVERY 12 HOURS SCHEDULED
Status: DISCONTINUED | OUTPATIENT
Start: 2025-05-01 | End: 2025-05-01 | Stop reason: HOSPADM

## 2025-05-01 RX ORDER — GUAIFENESIN AND DEXTROMETHORPHAN HYDROBROMIDE 10; 100 MG/5ML; MG/5ML
10 SYRUP ORAL 4 TIMES DAILY
COMMUNITY

## 2025-05-01 RX ORDER — FUROSEMIDE 10 MG/ML
INJECTION INTRAMUSCULAR; INTRAVENOUS PRN
Status: DISCONTINUED | OUTPATIENT
Start: 2025-05-01 | End: 2025-05-01 | Stop reason: HOSPADM

## 2025-05-01 RX ORDER — FENTANYL CITRATE 50 UG/ML
INJECTION, SOLUTION INTRAMUSCULAR; INTRAVENOUS PRN
Status: DISCONTINUED | OUTPATIENT
Start: 2025-05-01 | End: 2025-05-01 | Stop reason: HOSPADM

## 2025-05-01 RX ORDER — SODIUM CHLORIDE 0.9 % (FLUSH) 0.9 %
5-40 SYRINGE (ML) INJECTION PRN
Status: DISCONTINUED | OUTPATIENT
Start: 2025-05-01 | End: 2025-05-01 | Stop reason: HOSPADM

## 2025-05-01 RX ORDER — NITROGLYCERIN 0.4 MG/1
0.4 TABLET SUBLINGUAL EVERY 5 MIN PRN
Status: DISCONTINUED | OUTPATIENT
Start: 2025-05-01 | End: 2025-05-01 | Stop reason: HOSPADM

## 2025-05-01 RX ORDER — ASPIRIN 81 MG/1
81 TABLET ORAL DAILY
Qty: 90 TABLET | Refills: 1 | Status: SHIPPED | OUTPATIENT
Start: 2025-05-01

## 2025-05-01 RX ORDER — HYDROCORTISONE SODIUM SUCCINATE 100 MG/2ML
200 INJECTION INTRAMUSCULAR; INTRAVENOUS ONCE
Status: DISCONTINUED | OUTPATIENT
Start: 2025-05-01 | End: 2025-05-01 | Stop reason: HOSPADM

## 2025-05-01 RX ORDER — MIDAZOLAM HYDROCHLORIDE 1 MG/ML
INJECTION, SOLUTION INTRAMUSCULAR; INTRAVENOUS PRN
Status: DISCONTINUED | OUTPATIENT
Start: 2025-05-01 | End: 2025-05-01 | Stop reason: HOSPADM

## 2025-05-01 RX ORDER — SALMETEROL XINAFOATE 50 MCG
1 BLISTER, WITH INHALATION DEVICE INHALATION 2 TIMES DAILY
COMMUNITY

## 2025-05-01 RX ORDER — SODIUM CHLORIDE 9 MG/ML
INJECTION, SOLUTION INTRAVENOUS PRN
Status: DISCONTINUED | OUTPATIENT
Start: 2025-05-01 | End: 2025-05-01 | Stop reason: HOSPADM

## 2025-05-01 RX ORDER — IOPAMIDOL 755 MG/ML
INJECTION, SOLUTION INTRAVASCULAR PRN
Status: DISCONTINUED | OUTPATIENT
Start: 2025-05-01 | End: 2025-05-01 | Stop reason: HOSPADM

## 2025-05-01 RX ORDER — ACETAMINOPHEN 325 MG/1
650 TABLET ORAL EVERY 4 HOURS PRN
Status: DISCONTINUED | OUTPATIENT
Start: 2025-05-01 | End: 2025-05-01 | Stop reason: HOSPADM

## 2025-05-01 RX ORDER — HEPARIN SODIUM 1000 [USP'U]/ML
INJECTION, SOLUTION INTRAVENOUS; SUBCUTANEOUS PRN
Status: DISCONTINUED | OUTPATIENT
Start: 2025-05-01 | End: 2025-05-01 | Stop reason: HOSPADM

## 2025-05-01 RX ORDER — ASPIRIN 325 MG
325 TABLET ORAL ONCE
Status: COMPLETED | OUTPATIENT
Start: 2025-05-01 | End: 2025-05-01

## 2025-05-01 RX ORDER — CLOPIDOGREL BISULFATE 75 MG/1
TABLET ORAL PRN
Status: DISCONTINUED | OUTPATIENT
Start: 2025-05-01 | End: 2025-05-01 | Stop reason: HOSPADM

## 2025-05-01 RX ORDER — SODIUM CHLORIDE 9 MG/ML
INJECTION, SOLUTION INTRAVENOUS CONTINUOUS
Status: DISCONTINUED | OUTPATIENT
Start: 2025-05-01 | End: 2025-05-01 | Stop reason: HOSPADM

## 2025-05-01 RX ADMIN — ASPIRIN 325 MG: 325 TABLET ORAL at 10:15

## 2025-05-01 RX ADMIN — SODIUM CHLORIDE: 0.9 INJECTION, SOLUTION INTRAVENOUS at 10:16

## 2025-05-01 ASSESSMENT — PAIN SCALES - GENERAL: PAINLEVEL_OUTOF10: 0

## 2025-05-01 NOTE — DISCHARGE INSTRUCTIONS
Discharge Instructions for Radial Heart Catherization  -Take it easy for 3-4 days.  -No driving for 2 days.  -No lifting of 5 lbs or more for 5 days with the affected arm.  -May shower after 24 hours.  -Remove arm board after 24 hours.  -Apply a band aid to the insertion site daily for 5 days.  Wash site daily with soap and water.  -No creams, ointments, or powders near the insertion site.   -No tub baths, swimming, hot tubs, or hand washing dishes for 1 week.  -Watch for signs of infection (redness, warmth, swelling, or pus drainage) or coolness of extremity and call physician if this occurs  -If bleeding occurs from insertion site, apply pressure and call 911.   -Watch for numbness/tingling- if this occurs go to the Emergency Room immediately.  -Apply Ice for 15 minutes with an hour break in between and alternate with heat compress for 15 minutes to reduce swelling for 3-5 days.  -You may have a bruise and feel soreness at the site for a few days after the procedure

## 2025-05-01 NOTE — CARDIO/PULMONARY
Inpatient Cardiac Rehabilitation Consult    Received consult for Phase II Cardiac Rehabilitation. Patient needs cardiac rehab due to PCI on 5/1/25.  Importance of Cardiac Rehab discussed with patient. Reviewed cardiac rehab class times. Patient questions answered. Cardiac Rehab brochure given. States he uses a walker and wheelchair but would be able to use a stationary bike and lift some weights. Voicedinterest in the exercise and the education that's offered. Informed him one of our staff would be calling to evaluate his ability to participate and get him scheduled if he's a candidate. Voiced the understanding.

## 2025-05-01 NOTE — PLAN OF CARE
Problem: Pain  Goal: Verbalizes/displays adequate comfort level or baseline comfort level  Outcome: Progressing  Flowsheets (Taken 5/1/2025 0945)  Verbalizes/displays adequate comfort level or baseline comfort level:   Encourage patient to monitor pain and request assistance   Assess pain using appropriate pain scale     Problem: Safety - Adult  Goal: Free from fall injury  Outcome: Progressing     Problem: Cardiovascular - Adult  Goal: Maintains optimal cardiac output and hemodynamic stability  Outcome: Progressing  Flowsheets (Taken 5/1/2025 0930)  Maintains optimal cardiac output and hemodynamic stability:   Monitor blood pressure and heart rate   Monitor urine output and notify Licensed Independent Practitioner for values outside of normal range   Assess for signs of decreased cardiac output     Problem: Discharge Planning  Goal: Discharge to home or other facility with appropriate resources  Outcome: Progressing  Flowsheets (Taken 5/1/2025 0930)  Discharge to home or other facility with appropriate resources:   Identify barriers to discharge with patient and caregiver   Arrange for needed discharge resources and transportation as appropriate   Care plan reviewed with patient.  Patient verbalizes understanding of the plan of care and contributes to goal setting.

## 2025-05-01 NOTE — PROGRESS NOTES
1310 Care taken over from cath lab. Radial site stable, no bleeding seen, site soft.  Armboard continued with vascband. Patient instructed not to bend wrist, not to put pressure on wrist and not to lift  or twist with wrist. Patient voices understanding.

## 2025-05-01 NOTE — PROGRESS NOTES
Patient received and reviewed booklet \"How to care for your heart after coronary artery disease\". Reviewed how to take care of the incision site, the importance of participating in cardiac rehab and the hours of operations. Reviewed importance of reducing coronary artery disease risk factors.     Stent card given to patient.

## 2025-05-01 NOTE — BRIEF OP NOTE
Formerly named [facility]  Sedation/Analgesia Post Sedation Record        Pt Name: Arun Collazo  MRN: [id number]  YOB: 1965  Procedure Performed By: Ludin Betts MD MD, OBED, FLORECITA, YELITZA  Primary Care Physician: Keyon Cochran Cnp, APRN - CNP    POST-PROCEDURE                                  Sedation/Anesthesia:  Local Anesthesia and IV Conscious Sedation with continuous O2 monitoring    Estimated Blood Loss: 10 cc     Specimens Removed:  [x]None []Other:      Disposition of Specimen:  []Pathology []Other        Complications:   [x]None Immediate []Other:         Procedure Performed:  Left heart cath and PCI to mid LCx with CRESENCIO x 1    Post Procedure Diagnosis/Findings:  Coronary Artery Disease          Recommendations:   Transfer to Tele unit  DAPT   Lipid lowering therapy  Aggressive risk factor modification  Cardiac rehab  Monitor access site closely for bleeding  IV Fluids  Follow up with cardiology clinic with in 1-2 weeks    All questions and concerns were addressed and patient is in agreement with plan.                 Ludin Betts MD MD, OBED, FLORECITA, YELITZA  Electronically signed 5/1/2025 at 12:58 PM

## 2025-05-01 NOTE — FLOWSHEET NOTE
05/01/25 1644   AVS Reviewed   AVS & discharge instructions reviewed with patient and/or representative? Yes   Reviewed instructions with Patient   Level of Understanding Questions answered;Teach back completed;Verbalized understanding;Return demonstration       Pt has no further questions at this time.  Belongings gathered.  Pt leaving for home via transport .
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
## 2025-05-01 NOTE — PROGRESS NOTES
0900 Patient admitted to 2E05  ambulatory for Heart Cath.  Patient NPO. Patient accompanied by himself.  States he came via [employer] transport and lives by himself.   Vital signs obtained.   Assessment and data collection intiated.   Oriented to room.  Policies and procedures for  explained.   All questions answered with no further questions at this time.   Fall prevention and safety precautions discussed with patient.

## 2025-05-01 NOTE — PROGRESS NOTES
Ruth Hempfling,CNP updated on elevated WBC, 25.5.   Orders received for portable CXR and Ruth at bedside to evaluate patient.     yes

## 2025-05-16 ENCOUNTER — OFFICE VISIT (OUTPATIENT)
Age: 60
End: 2025-05-16
Payer: MEDICARE

## 2025-05-16 VITALS
BODY MASS INDEX: 36.69 KG/M2 | SYSTOLIC BLOOD PRESSURE: 120 MMHG | HEART RATE: 80 BPM | TEMPERATURE: 98 F | DIASTOLIC BLOOD PRESSURE: 72 MMHG | WEIGHT: 220.2 LBS | OXYGEN SATURATION: 95 % | HEIGHT: 65 IN

## 2025-05-16 DIAGNOSIS — G47.30 SLEEP APNEA, UNSPECIFIED TYPE: Primary | ICD-10-CM

## 2025-05-16 DIAGNOSIS — G47.10 HYPERSOMNIA: ICD-10-CM

## 2025-05-16 DIAGNOSIS — R56.9 SEIZURES (HCC): ICD-10-CM

## 2025-05-16 DIAGNOSIS — I10 ESSENTIAL HYPERTENSION: ICD-10-CM

## 2025-05-16 DIAGNOSIS — I25.10 CORONARY ARTERY DISEASE INVOLVING NATIVE CORONARY ARTERY OF NATIVE HEART WITHOUT ANGINA PECTORIS: ICD-10-CM

## 2025-05-16 DIAGNOSIS — G47.33 OSA (OBSTRUCTIVE SLEEP APNEA): ICD-10-CM

## 2025-05-16 PROCEDURE — 3078F DIAST BP <80 MM HG: CPT | Performed by: INTERNAL MEDICINE

## 2025-05-16 PROCEDURE — 99204 OFFICE O/P NEW MOD 45 MIN: CPT | Performed by: INTERNAL MEDICINE

## 2025-05-16 PROCEDURE — 3074F SYST BP LT 130 MM HG: CPT | Performed by: INTERNAL MEDICINE

## 2025-05-16 NOTE — PROGRESS NOTES
Chief Complaint: Sleep consult for sleep apnea, referred by Dr Cochran, PSG 10/11/10 & 10/5/15, titration 11/2/10, echo 4/23/25, not currently using PAP machine.     Mallampati airway Class: 1  Neck Circumference: 17.75 inches    Paoli sleepiness score 5/16/25: ***  SAQLI: ***        
options including Uvulopalatopharyngoplasty, maxillomandibular ostomy, Inspire device placement and tracheostomy as last option. At the end of discussion, he decided on inspire device placement as his  treatment if he found to have obstructive sleep apnea at this time.  -Arun Collazo was advised to keep his scheduled follow up at [facility] for further evaluation and management of bronchial asthma.  -We will see Arun Collazo back in 1week after the sleep study to go over the sleep study results and further management options.  -He was educated to practice good sleep hygiene practices. He was provided with a good sleep hygiene hand out.  -Arun was advised to make earlier appointment with my clinic if he develops any worsening of sleep symptoms. He verbalizes understanding.  -Arun was advised to not to drive any motor vehicles or operate heavy equipment until his sleep symptoms are under good control.Arun Collazo verbalizes understanding.  -He was advised to loose weight by controlling diet and doing exercise once cleared by his family physician.   - Arun Collazo was educated about my impression and plan. He verbalizes understanding.        -I personally reviewed updated the Past medical hx, Past surgical hx,Social hx, Family hx, Medications, Allergies in the discrete data section of the patient chart along with labs, Pulmonary medicine,Sleep medicine related, Pathological, Microbiological and Radiological investigations.

## 2025-05-16 NOTE — PATIENT INSTRUCTIONS
Recommendations/Plan:  -Will schedule patient for polysomnogram in the sleep lab with a seizure montage due to his history of seizures.  He is currently on treatment with Lamictal for his seizures.   -I had a discussion with patient regarding avialable treatment options for his sleep disorder breathing including but not limited to CPAP titration in the sleep lab Vs Dental appliance placement with referral to a local dentist Vs other available surgical options including Uvulopalatopharyngoplasty, maxillomandibular ostomy, Inspire device placement and tracheostomy as last option. At the end of discussion, he decided on inspire device placement as his  treatment if he found to have obstructive sleep apnea at this time.  -Arun Collazo was advised to keep his scheduled follow up at [facility] ( [facility] Pulmonary clinic for further evaluation and management of bronchial asthma.  -We will see Arun Collazo back in 1week after the sleep study to go over the sleep study results and further management options.  -He was educated to practice good sleep hygiene practices. He was provided with a good sleep hygiene hand out.  -Arun was advised to make earlier appointment with my clinic if he develops any worsening of sleep symptoms. He verbalizes understanding.  -Arun was advised to not to drive any motor vehicles or operate heavy equipment until his sleep symptoms are under good control.Arun Collazo verbalizes understanding.  -He was advised to loose weight by controlling diet and doing exercise once cleared by his family physician.   - Arun Collazo was educated about my impression and plan. He verbalizes understanding.  
No

## 2025-05-20 ENCOUNTER — TELEPHONE (OUTPATIENT)
Dept: SLEEP CENTER | Age: 60
End: 2025-05-20

## 2025-05-20 NOTE — PROGRESS NOTES
[facility] [place] [facility]  [facility] CARDIOLOGY  [address]  [address]  [place] [address]  Dept: [phone]  Dept Fax: [phone]  Loc: [phone]    Visit Date: 5/22/2025    Arun Collazo is a 60 y.o. male who presents todayfor:  Chief Complaint   Patient presents with    Follow-up     Cardiologist: Roxane    Hx of recurrent CVA, Cad/PCI, severe COPD, smoker, HTN, DM, DVT/PE    HPI:f/u of LHC, s/p PCI to Lcx  Symptoms improved post PCI. No recurrent angina. Sob is at baseline, chronic from COPD. No swelling or orthopnea. Bp and HR are stable.   Past Surgical History:   Procedure Laterality Date    APPENDECTOMY      CARDIAC CATHETERIZATION  2015    CARDIAC PROCEDURE N/A 5/1/2025    Left heart cath / coronary angiography performed by Ludin Betts MD at [facility] CARDIAC CATH LAB    CARDIAC PROCEDURE N/A 5/1/2025    Percutaneous coronary intervention performed by Ludin Betts MD at [facility] CARDIAC CATH LAB    CORONARY ANGIOPLASTY WITH STENT PLACEMENT      1 stent total, 2008    UPPER GASTROINTESTINAL ENDOSCOPY N/A 10/4/2020    EGD BIOPSY performed by Jaret Mohamud MD at [facility] Endoscopy    URETHRAL STRICTURE DILATATION       Family History   Problem Relation Age of Onset    Heart Surgery Mother     Atrial Fibrillation Mother     Hypertension Mother     Heart Attack Father     Hypertension Father     Heart Surgery Sister     Heart Attack Brother      Social History     Tobacco Use    Smoking status: Former     Average packs/day: 1.5 packs/day for 50.2 years (75.4 ttl pk-yrs)     Types: Cigarettes     Start date: 1975    Smokeless tobacco: Never    Tobacco comments:     5 cigarettes a day 3/14/16   Substance Use Topics    Alcohol use: No     Alcohol/week: 0.0 standard drinks of alcohol      Current Outpatient Medications   Medication Sig Dispense Refill    nitroGLYCERIN (NITROSTAT) 0.4 MG SL tablet Place 1 tablet under the tongue every 5 minutes as needed for Chest pain 25 tablet 3

## 2025-05-20 NOTE — TELEPHONE ENCOUNTER
Please schedule a f/u appt for Arun.  His PSG is scheduled for 06/04/25.    He called the sleep center to schedule his sleep study however, I never received an order.  Just an fyi :)        Thank you,  Damaris

## 2025-05-22 ENCOUNTER — OFFICE VISIT (OUTPATIENT)
Dept: CARDIOLOGY CLINIC | Age: 60
End: 2025-05-22
Payer: MEDICARE

## 2025-05-22 VITALS
HEART RATE: 88 BPM | BODY MASS INDEX: 36.49 KG/M2 | DIASTOLIC BLOOD PRESSURE: 80 MMHG | WEIGHT: 219 LBS | HEIGHT: 65 IN | SYSTOLIC BLOOD PRESSURE: 129 MMHG

## 2025-05-22 DIAGNOSIS — F17.210 CIGARETTE SMOKER: ICD-10-CM

## 2025-05-22 DIAGNOSIS — I10 PRIMARY HYPERTENSION: ICD-10-CM

## 2025-05-22 DIAGNOSIS — E78.2 MIXED HYPERLIPIDEMIA: ICD-10-CM

## 2025-05-22 DIAGNOSIS — I25.10 CORONARY ARTERY DISEASE INVOLVING NATIVE CORONARY ARTERY OF NATIVE HEART WITHOUT ANGINA PECTORIS: Primary | ICD-10-CM

## 2025-05-22 PROCEDURE — 3079F DIAST BP 80-89 MM HG: CPT | Performed by: STUDENT IN AN ORGANIZED HEALTH CARE EDUCATION/TRAINING PROGRAM

## 2025-05-22 PROCEDURE — 3074F SYST BP LT 130 MM HG: CPT | Performed by: STUDENT IN AN ORGANIZED HEALTH CARE EDUCATION/TRAINING PROGRAM

## 2025-05-22 PROCEDURE — 99214 OFFICE O/P EST MOD 30 MIN: CPT | Performed by: STUDENT IN AN ORGANIZED HEALTH CARE EDUCATION/TRAINING PROGRAM

## 2025-05-22 RX ORDER — NITROGLYCERIN 0.4 MG/1
0.4 TABLET SUBLINGUAL EVERY 5 MIN PRN
Qty: 25 TABLET | Refills: 3 | Status: SHIPPED | OUTPATIENT
Start: 2025-05-22

## 2025-05-22 NOTE — PROGRESS NOTES
Patient here for follow up.  Had heart cath with stent.  Patient denies any cardiac symptoms.  States feeling pretty good.

## 2025-06-04 ENCOUNTER — HOSPITAL ENCOUNTER (OUTPATIENT)
Dept: SLEEP CENTER | Age: 60
Discharge: HOME | End: 2025-06-06
Payer: MEDICARE

## 2025-06-04 DIAGNOSIS — I10 ESSENTIAL HYPERTENSION: ICD-10-CM

## 2025-06-04 DIAGNOSIS — G47.10 HYPERSOMNIA: ICD-10-CM

## 2025-06-04 DIAGNOSIS — R56.9 SEIZURES (HCC): ICD-10-CM

## 2025-06-04 DIAGNOSIS — G47.30 SLEEP APNEA, UNSPECIFIED TYPE: ICD-10-CM

## 2025-06-04 DIAGNOSIS — I25.10 CORONARY ARTERY DISEASE INVOLVING NATIVE CORONARY ARTERY OF NATIVE HEART WITHOUT ANGINA PECTORIS: ICD-10-CM

## 2025-06-04 DIAGNOSIS — G47.33 OSA (OBSTRUCTIVE SLEEP APNEA): ICD-10-CM

## 2025-06-04 PROCEDURE — 95810 POLYSOM 6/> YRS 4/> PARAM: CPT

## 2025-06-05 NOTE — PROGRESS NOTES
Arun was here for a PSG study on 6/4/2025. He was unable to get comfortable and fall asleep. He ended up terminating the study around 0200. The [facility] tech noted that he is willing to come back in if he can have a sleep aid to help with getting to sleep. I will confirm with the patient and send a note for the sleep aid to the Physician.

## 2025-06-10 ENCOUNTER — TELEPHONE (OUTPATIENT)
Dept: SLEEP CENTER | Age: 60
End: 2025-06-10

## 2025-06-10 DIAGNOSIS — G47.00 INSOMNIA, UNSPECIFIED TYPE: Primary | ICD-10-CM

## 2025-06-10 RX ORDER — ZOLPIDEM TARTRATE 10 MG/1
10 TABLET ORAL NIGHTLY PRN
Qty: 1 TABLET | Refills: 0 | Status: SHIPPED | OUTPATIENT
Start: 2025-06-10 | End: 2025-06-11

## 2025-06-10 NOTE — TELEPHONE ENCOUNTER
Arun Arroyo was here for a PSG on 6/4/25, he did not sleep at all and ended up terminating his sleep study around 0200. He is willing to repeat the study if he can have a sleep aid called in for him. Would you be willing call a sleep aid in for him? If so, I will let the patient know to pickup before his next study. Thanks so much.   His pharmacy is:  [facility] - [id number] [facility] [address] [place] [address] [phone] - F [phone]  [address] [place] [id number]  Phone: [phone]  Fax: [phone]

## 2025-06-16 ENCOUNTER — TELEPHONE (OUTPATIENT)
Age: 60
End: 2025-06-16

## 2025-06-16 NOTE — TELEPHONE ENCOUNTER
Pt called and needs [facility] for his sleep study.  Can you set this up for him? Or let me know if you need me to do something?
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
## 2025-06-19 ENCOUNTER — HOSPITAL ENCOUNTER (OUTPATIENT)
Dept: SLEEP CENTER | Age: 60
Discharge: HOME | End: 2025-06-21
Payer: MEDICARE

## 2025-06-19 PROCEDURE — 95810 POLYSOM 6/> YRS 4/> PARAM: CPT

## 2025-07-17 NOTE — PROGRESS NOTES
Chief Complaint:  leonardo, hypersomnia, hypertension, seizures & coronary artery disease follow up after baseline sleep study     Mallampati airway Class:  1    Neck Circumference:  17.75    Regent sleepiness score:      SAQLI:

## 2025-07-18 ENCOUNTER — OFFICE VISIT (OUTPATIENT)
Age: 60
End: 2025-07-18
Payer: MEDICARE

## 2025-07-18 VITALS
TEMPERATURE: 98 F | WEIGHT: 224.4 LBS | BODY MASS INDEX: 37.39 KG/M2 | SYSTOLIC BLOOD PRESSURE: 124 MMHG | HEIGHT: 65 IN | OXYGEN SATURATION: 98 % | HEART RATE: 71 BPM | DIASTOLIC BLOOD PRESSURE: 76 MMHG

## 2025-07-18 DIAGNOSIS — G47.10 HYPERSOMNIA: ICD-10-CM

## 2025-07-18 DIAGNOSIS — G47.33 OSA (OBSTRUCTIVE SLEEP APNEA): Primary | ICD-10-CM

## 2025-07-18 DIAGNOSIS — E66.9 OBESITY (BMI 30-39.9): ICD-10-CM

## 2025-07-18 PROCEDURE — 3074F SYST BP LT 130 MM HG: CPT | Performed by: INTERNAL MEDICINE

## 2025-07-18 PROCEDURE — 3078F DIAST BP <80 MM HG: CPT | Performed by: INTERNAL MEDICINE

## 2025-07-18 PROCEDURE — 99214 OFFICE O/P EST MOD 30 MIN: CPT | Performed by: INTERNAL MEDICINE

## 2025-07-18 NOTE — PATIENT INSTRUCTIONS
Recommendations/Plan:  -He was informed about the current criteria for Medicare medical insurance to receive/qualify for inspire device placement including his BMI has to be less than 35.  He was informed to lose weight I.e another 14 pounds to qualify for inspire device placement.  -He had an appointment with his family physician on 21 July 2025.  He is going to discuss his weight loss management options with his family physician.  -Schedule patient for consultation with weight management center at [facility]/Dr. Simon Bar MD for weight management.  -I had a discussion with patient regarding avialable treatment options for his sleep disorder breathing including but not limited to CPAP/BiPAP  retitration in the sleep lab Vs  available surgical options including maxillomandibular ostomy, Inspire device placement and tracheostomy as last option. At the end of discussion, he decided to go for inspire device placement as a treatment for his severe obstructive sleep apnea.  He did not want to try CPAP/BiPAP therapy.  -He is aware of the consequences of untreated his severe obstructive sleep apnea including increased risk for cardiovascular and cerebrovascular events and morbidity including death.  -Schedule patient for Ear, Nose and Throat specialist consultation Dr. Cristhian Ellsworth MD as soon as possible for further evaluation for inspire device placement as a treatment for his severe obstructive sleep apnea.  Patient quit using his a CPAP due to intolerance.  -He was advised to continue to practice good sleep hygiene practices.  -He was advised to loose weight by controlling diet and doing exercise.  -He was instructed to not to drive any motor vehicles or operate heavy equipment if he feels sleepy.   -He advised to keep good compliance with recommended inspire device after placement to get optimal results and clinical improvement.  -Arun Collazo was informed to call for follow up with my clinic in

## (undated) DEVICE — DEVICE INFL 20ML 30ATM POLYCARB BRL ABS PLUNG DGT DISPLAY

## (undated) DEVICE — SOLUTION IV 1000ML 0.45% SOD CHL PH 5 INJ USP VIAFLX PLAS

## (undated) DEVICE — KIT MFLD ISOLATN NACL CNTRST PRT TBNG SPIK W/ PRSS TRNSDUC

## (undated) DEVICE — Device: Brand: FIELDER XT

## (undated) DEVICE — CATHETER GUID 6FR L100CM DIA0.071IN NYL SHFT EBU3.5

## (undated) DEVICE — DRAPE KIT RAMAPR RADIATION SHIELD

## (undated) DEVICE — CONMED SCOPE SAVER BITE BLOCK, 20X27 MM: Brand: SCOPE SAVER

## (undated) DEVICE — KIT INF CTRL 2OZ LUB TBNG L12FT DBL END BRSH SYR OP4

## (undated) DEVICE — KIT ANGIO W/ AT P65 PREM HND CTRL FOR CNTRST DEL ANGIOTOUCH

## (undated) DEVICE — RUNTHROUGH NS EXTRA FLOPPY PTCA GUIDEWIRE: Brand: RUNTHROUGH

## (undated) DEVICE — SOLUTION IV IRRIG WATER 500ML POUR BRL ST 2F7113

## (undated) DEVICE — FORCEP RAD JAW W/NEEDLE 160CM

## (undated) DEVICE — VALVE HEMSTAS W/ GWIRE INSRTN TOOL GRDIAN II NC

## (undated) DEVICE — GUIDEWIRE VASC L260CM DIA0.035IN RAD 3MM J TIP L7CM PTFE

## (undated) DEVICE — GLIDESHEATH SLENDER ACCESS KIT: Brand: GLIDESHEATH SLENDER

## (undated) DEVICE — SET ADMIN 25ML L117IN PMP MOD CK VLV RLER CLMP 2 SMRTSITE

## (undated) DEVICE — CATHETER INTVENT OTW 0.014 IN 130 CM COILED SHFT SUPERCROSS

## (undated) DEVICE — CATHETER DIAG 5FR L100CM LUMN ID0.047IN JL3.5 CRV 0 SIDE H

## (undated) DEVICE — CATHETER DIAG 5FR L100CM LUMN ID0.047IN JR4 CRV 0 SIDE H

## (undated) DEVICE — CATH BLLN ANGIO 3X12MM NC EUPHORIA RX

## (undated) DEVICE — Device

## (undated) DEVICE — 4-PORT MANIFOLD: Brand: NEPTUNE 2

## (undated) DEVICE — SET LNR RED GRN W/ BASE CLEANASCOPE

## (undated) DEVICE — BIOGUARD A/W CLEANING ADAPTER

## (undated) DEVICE — BAND COMPR L24CM REG CLR PLAS HEMSTAT EXT HK AND LOOP RETEN